# Patient Record
Sex: FEMALE | Race: WHITE | Employment: OTHER | URBAN - METROPOLITAN AREA
[De-identification: names, ages, dates, MRNs, and addresses within clinical notes are randomized per-mention and may not be internally consistent; named-entity substitution may affect disease eponyms.]

---

## 2018-08-21 PROBLEM — Z41.1 ELECTIVE PROCEDURE FOR UNACCEPTABLE COSMETIC APPEARANCE: Status: ACTIVE | Noted: 2018-08-21

## 2021-12-27 RX ORDER — SODIUM CHLORIDE, SODIUM LACTATE, POTASSIUM CHLORIDE, CALCIUM CHLORIDE 600; 310; 30; 20 MG/100ML; MG/100ML; MG/100ML; MG/100ML
1000 INJECTION, SOLUTION INTRAVENOUS CONTINUOUS
Status: CANCELLED | OUTPATIENT
Start: 2021-12-27

## 2021-12-29 ENCOUNTER — HOSPITAL ENCOUNTER (OUTPATIENT)
Dept: PREADMISSION TESTING | Age: 81
Discharge: HOME | End: 2022-01-02
Payer: MEDICARE

## 2021-12-29 VITALS
TEMPERATURE: 98.1 F | DIASTOLIC BLOOD PRESSURE: 79 MMHG | HEART RATE: 77 BPM | WEIGHT: 143 LBS | SYSTOLIC BLOOD PRESSURE: 173 MMHG | BODY MASS INDEX: 25.34 KG/M2 | RESPIRATION RATE: 20 BRPM | OXYGEN SATURATION: 98 % | HEIGHT: 63 IN

## 2021-12-29 LAB
ANION GAP SERPL CALCULATED.3IONS-SCNC: 14 MMOL/L (ref 9–17)
BUN BLDV-MCNC: 22 MG/DL (ref 8–23)
CHLORIDE BLD-SCNC: 105 MMOL/L (ref 98–107)
CO2: 22 MMOL/L (ref 20–31)
CREAT SERPL-MCNC: 0.77 MG/DL (ref 0.5–0.9)
GFR AFRICAN AMERICAN: >60 ML/MIN
GFR NON-AFRICAN AMERICAN: >60 ML/MIN
GFR SERPL CREATININE-BSD FRML MDRD: NORMAL ML/MIN/{1.73_M2}
GFR SERPL CREATININE-BSD FRML MDRD: NORMAL ML/MIN/{1.73_M2}
GLUCOSE BLD-MCNC: 112 MG/DL (ref 70–99)
HCT VFR BLD CALC: 41.8 % (ref 36.3–47.1)
HEMOGLOBIN: 13 G/DL (ref 11.9–15.1)
INR BLD: 0.9
MCH RBC QN AUTO: 28.5 PG (ref 25.2–33.5)
MCHC RBC AUTO-ENTMCNC: 31.1 G/DL (ref 28.4–34.8)
MCV RBC AUTO: 91.7 FL (ref 82.6–102.9)
NRBC AUTOMATED: 0 PER 100 WBC
PARTIAL THROMBOPLASTIN TIME: 25.5 SEC (ref 20.5–30.5)
PDW BLD-RTO: 13.4 % (ref 11.8–14.4)
PLATELET # BLD: 249 K/UL (ref 138–453)
PMV BLD AUTO: 12.4 FL (ref 8.1–13.5)
POTASSIUM SERPL-SCNC: 4 MMOL/L (ref 3.7–5.3)
PROTHROMBIN TIME: 10 SEC (ref 9.1–12.3)
RBC # BLD: 4.56 M/UL (ref 3.95–5.11)
SODIUM BLD-SCNC: 141 MMOL/L (ref 135–144)
WBC # BLD: 7.9 K/UL (ref 3.5–11.3)

## 2021-12-29 PROCEDURE — 82947 ASSAY GLUCOSE BLOOD QUANT: CPT

## 2021-12-29 PROCEDURE — 85610 PROTHROMBIN TIME: CPT

## 2021-12-29 PROCEDURE — 36415 COLL VENOUS BLD VENIPUNCTURE: CPT

## 2021-12-29 PROCEDURE — 85027 COMPLETE CBC AUTOMATED: CPT

## 2021-12-29 PROCEDURE — 80051 ELECTROLYTE PANEL: CPT

## 2021-12-29 PROCEDURE — 93005 ELECTROCARDIOGRAM TRACING: CPT | Performed by: ANESTHESIOLOGY

## 2021-12-29 PROCEDURE — 85730 THROMBOPLASTIN TIME PARTIAL: CPT

## 2021-12-29 PROCEDURE — 84520 ASSAY OF UREA NITROGEN: CPT

## 2021-12-29 PROCEDURE — 82565 ASSAY OF CREATININE: CPT

## 2021-12-29 NOTE — H&P
History and Physical    Pt Name: Olga Malagon  MRN: [id number]  YOB: 1940  Date of evaluation: 12/29/2021  Primary Care Physician: Dorsey Gitelman, MD    SUBJECTIVE:   History of Chief Complaint:    Olga Malagon is a 79 y.o. female who presents for PAT appointment. Patient complains of left flank pain and left lower quadrant pain since May of this year that has worsened in recent months. Patient denies dysuria and states this pain is unassociated with urination. Patient reports urgency, frequency and \"pink\" tinged urine. Patient has been scheduled for CYSTOSCOPY, INSERTION OCCLUSIVE BALLOON  (PT. GOING TO I.R.) - Left and HOLMIUM, PERCUTANEOUS NEPHROLITHOTOMY, C-ARM, LITHOCLAST  (PT. COMING FROM INTERVENTIONAL) - Left  Allergies  is allergic to seasonal.  Medications  Prior to Admission medications    Medication Sig Start Date End Date Taking? Authorizing Provider   atorvastatin (LIPITOR) 20 MG tablet Take 20 mg by mouth daily   Yes Historical Provider, MD   amLODIPine (NORVASC) 2.5 MG tablet Take 2.5 mg by mouth daily. Yes Historical Provider, MD   levothyroxine (SYNTHROID) 25 MCG tablet Take 25 mcg by mouth Daily. Yes Historical Provider, MD   Cholecalciferol (VITAMIN D3) 75718 UNITS CAPS Take by mouth once a week SUNDAYS   Yes Historical Provider, MD   Coenzyme Q10 (CO Q 10 PO) Take  by mouth. Yes Historical Provider, MD   LISINOPRIL PO Take  by mouth. Yes Historical Provider, MD   ezetimibe (ZETIA) 10 MG tablet Take 10 mg by mouth daily. Historical Provider, MD   calcium carbonate (OSCAL) 500 MG TABS tablet Take 500 mg by mouth daily. Historical Provider, MD     Past Medical History    has a past medical history of Dental crowns present, Hyperlipidemia, Hypertension, Osteoarthritis, Renal calculus, Thyroid disease, Under care of team, Under care of team, and Under care of team.  Past Surgical History   has a past surgical history that includes Breast surgery (2005);  Cataract removal with implant (Bilateral, 10/2019); Cosmetic surgery (2021); and Colonoscopy. Social History   reports that she has never smoked. She has never used smokeless tobacco.    has no history on file for alcohol use. reports no history of drug use. Marital Status   Children 1  Occupation retired teacher  Family History  Family Status   Relation Name Status    Mother     Hanna Her Father       family history includes Emphysema in her mother; No Known Problems in her father. Review of Systems:  CONSTITUTIONAL:   negative for fevers, chills, fatigue and malaise    EYES:   negative for double vision, blurred vision and photophobia    HEENT:   negative for tinnitus, epistaxis and sore throat     RESPIRATORY:   negative for cough, shortness of breath, wheezing     CARDIOVASCULAR:   negative for chest pain, palpitations, syncope, edema     GASTROINTESTINAL:   negative for nausea, vomiting     GENITOURINARY:   negative for incontinence history of left flank pain, LLQ pain, urinary frequency, urgency, and pink tinged urine. MUSCULOSKELETAL:   negative for neck or back pain     NEUROLOGICAL:   Negative for weakness and tingling  negative for headaches and dizziness     PSYCHIATRIC:   negative for anxiety       OBJECTIVE:   VITALS:  height is 5' 2.5\" (1.588 m) and weight is 143 lb (64.9 kg). Her temporal temperature is 98.1 °F (36.7 °C). Her blood pressure is 173/79 (abnormal) and her pulse is 77. Her respiration is 20 and oxygen saturation is 98%. CONSTITUTIONAL:alert & oriented x 3, no acute distress. Calm and pleasant but anxious. SKIN:  Warm and dry, no rashes to exposed areas of skin. HEAD:  Normocephalic, atraumatic. EYES: PERRL. EOMs intact. Wearing glasses. EARS:  Intact and equal bilaterally. No edema or thickening, without lumps, lesions, or discharge. Hearing grossly WNL. NOSE:  Nares patent.   No rhinorrhea   MOUTH/THROAT:  Mucous membranes pink and moist, uvula midline, teeth appear to be intact. NECK:supple, no lymphadenopathy  LUNGS: Respirations even and non-labored. Clear to auscultation bilaterally, no wheezes, rales, or rhonchi. CARDIOVASCULAR: Regular rate and rhythm, no murmurs/rubs/gallops   ABDOMEN: soft, non-tender, non-distended, bowel sounds active x 4   EXTREMITIES: No edema to bilateral lower extremities. No varicosities to bilateral lower extremities. NEUROLOGIC: CN II-XII are grossly intact. Gait is smooth, rhythmic and effortless. Testing:   EK2021  Labs pending: drawn 2021   IMPRESSIONS:   Renal calculus.    PLANS:   CYSTOSCOPY, INSERTION OCCLUSIVE BALLOON  (PT. GOING TO I.R.) - Left and HOLMIUM, PERCUTANEOUS NEPHROLITHOTOMY, C-ARM, LITHOCLAST  (PT. COMING FROM INTERVENTIONAL) - Left    NANCIE Mckinney - CNP  Electronically signed 2021 at 4:33 PM

## 2021-12-29 NOTE — H&P (VIEW-ONLY)
History and Physical    Pt Name: Murtaza Devries  MRN: [id number]  YOB: 1940  Date of evaluation: 12/29/2021  Primary Care Physician: Linwood Self MD    SUBJECTIVE:   History of Chief Complaint:    Murtaza Devries is a 79 y.o. female who presents for PAT appointment. Patient complains of left flank pain and left lower quadrant pain since May of this year that has worsened in recent months. Patient denies dysuria and states this pain is unassociated with urination. Patient reports urgency, frequency and \"pink\" tinged urine. Patient has been scheduled for CYSTOSCOPY, INSERTION OCCLUSIVE BALLOON  (PT. GOING TO I.R.) - Left and HOLMIUM, PERCUTANEOUS NEPHROLITHOTOMY, C-ARM, LITHOCLAST  (PT. COMING FROM INTERVENTIONAL) - Left  Allergies  is allergic to seasonal.  Medications  Prior to Admission medications    Medication Sig Start Date End Date Taking? Authorizing Provider   atorvastatin (LIPITOR) 20 MG tablet Take 20 mg by mouth daily   Yes Historical Provider, MD   amLODIPine (NORVASC) 2.5 MG tablet Take 2.5 mg by mouth daily. Yes Historical Provider, MD   levothyroxine (SYNTHROID) 25 MCG tablet Take 25 mcg by mouth Daily. Yes Historical Provider, MD   Cholecalciferol (VITAMIN D3) 33215 UNITS CAPS Take by mouth once a week SUNDAYS   Yes Historical Provider, MD   Coenzyme Q10 (CO Q 10 PO) Take  by mouth. Yes Historical Provider, MD   LISINOPRIL PO Take  by mouth. Yes Historical Provider, MD   ezetimibe (ZETIA) 10 MG tablet Take 10 mg by mouth daily. Historical Provider, MD   calcium carbonate (OSCAL) 500 MG TABS tablet Take 500 mg by mouth daily. Historical Provider, MD     Past Medical History    has a past medical history of Dental crowns present, Hyperlipidemia, Hypertension, Osteoarthritis, Renal calculus, Thyroid disease, Under care of team, Under care of team, and Under care of team.  Past Surgical History   has a past surgical history that includes Breast surgery (2005);  Cataract removal with implant (Bilateral, 10/2019); Cosmetic surgery (2021); and Colonoscopy. Social History   reports that she has never smoked. She has never used smokeless tobacco.    has no history on file for alcohol use. reports no history of drug use. Marital Status   Children 1  Occupation retired teacher  Family History  Family Status   Relation Name Status    Mother     Aaliyah Bailey Father       family history includes Emphysema in her mother; No Known Problems in her father. Review of Systems:  CONSTITUTIONAL:   negative for fevers, chills, fatigue and malaise    EYES:   negative for double vision, blurred vision and photophobia    HEENT:   negative for tinnitus, epistaxis and sore throat     RESPIRATORY:   negative for cough, shortness of breath, wheezing     CARDIOVASCULAR:   negative for chest pain, palpitations, syncope, edema     GASTROINTESTINAL:   negative for nausea, vomiting     GENITOURINARY:   negative for incontinence history of left flank pain, LLQ pain, urinary frequency, urgency, and pink tinged urine. MUSCULOSKELETAL:   negative for neck or back pain     NEUROLOGICAL:   Negative for weakness and tingling  negative for headaches and dizziness     PSYCHIATRIC:   negative for anxiety       OBJECTIVE:   VITALS:  height is 5' 2.5\" (1.588 m) and weight is 143 lb (64.9 kg). Her temporal temperature is 98.1 °F (36.7 °C). Her blood pressure is 173/79 (abnormal) and her pulse is 77. Her respiration is 20 and oxygen saturation is 98%. CONSTITUTIONAL:alert & oriented x 3, no acute distress. Calm and pleasant but anxious. SKIN:  Warm and dry, no rashes to exposed areas of skin. HEAD:  Normocephalic, atraumatic. EYES: PERRL. EOMs intact. Wearing glasses. EARS:  Intact and equal bilaterally. No edema or thickening, without lumps, lesions, or discharge. Hearing grossly WNL. NOSE:  Nares patent.   No rhinorrhea   MOUTH/THROAT:  Mucous membranes pink and moist, uvula midline, teeth appear to be intact. NECK:supple, no lymphadenopathy  LUNGS: Respirations even and non-labored. Clear to auscultation bilaterally, no wheezes, rales, or rhonchi. CARDIOVASCULAR: Regular rate and rhythm, no murmurs/rubs/gallops   ABDOMEN: soft, non-tender, non-distended, bowel sounds active x 4   EXTREMITIES: No edema to bilateral lower extremities. No varicosities to bilateral lower extremities. NEUROLOGIC: CN II-XII are grossly intact. Gait is smooth, rhythmic and effortless. Testing:   EK2021  Labs pending: drawn 2021   IMPRESSIONS:   Renal calculus.    PLANS:   CYSTOSCOPY, INSERTION OCCLUSIVE BALLOON  (PT. GOING TO I.R.) - Left and HOLMIUM, PERCUTANEOUS NEPHROLITHOTOMY, C-ARM, LITHOCLAST  (PT. COMING FROM INTERVENTIONAL) - Left    NANCIE Marc - CNP  Electronically signed 2021 at 4:33 PM

## 2021-12-30 LAB
EKG ATRIAL RATE: 70 BPM
EKG P AXIS: -20 DEGREES
EKG P-R INTERVAL: 166 MS
EKG Q-T INTERVAL: 414 MS
EKG QRS DURATION: 84 MS
EKG QTC CALCULATION (BAZETT): 447 MS
EKG R AXIS: -20 DEGREES
EKG T AXIS: 58 DEGREES
EKG VENTRICULAR RATE: 70 BPM

## 2021-12-30 RX ORDER — SODIUM CHLORIDE 9 MG/ML
INJECTION, SOLUTION INTRAVENOUS CONTINUOUS
Status: CANCELLED | OUTPATIENT
Start: 2021-12-30

## 2021-12-30 RX ORDER — CIPROFLOXACIN 2 MG/ML
400 INJECTION, SOLUTION INTRAVENOUS ONCE
Status: CANCELLED | OUTPATIENT
Start: 2021-12-30 | End: 2021-12-30

## 2022-01-05 ENCOUNTER — ANESTHESIA EVENT (OUTPATIENT)
Dept: OPERATING ROOM | Age: 82
End: 2022-01-05
Payer: MEDICARE

## 2022-01-05 ENCOUNTER — APPOINTMENT (OUTPATIENT)
Dept: GENERAL RADIOLOGY | Age: 82
End: 2022-01-05
Attending: UROLOGY
Payer: MEDICARE

## 2022-01-05 ENCOUNTER — HOSPITAL ENCOUNTER (OUTPATIENT)
Age: 82
Setting detail: OBSERVATION
Discharge: HOME | End: 2022-01-07
Attending: UROLOGY | Admitting: UROLOGY
Payer: MEDICARE

## 2022-01-05 ENCOUNTER — ANESTHESIA (OUTPATIENT)
Dept: OPERATING ROOM | Age: 82
End: 2022-01-05
Payer: MEDICARE

## 2022-01-05 ENCOUNTER — ANESTHESIA (OUTPATIENT)
Dept: INTERVENTIONAL RADIOLOGY/VASCULAR | Age: 82
End: 2022-01-05
Payer: MEDICARE

## 2022-01-05 ENCOUNTER — ANESTHESIA EVENT (OUTPATIENT)
Dept: INTERVENTIONAL RADIOLOGY/VASCULAR | Age: 82
End: 2022-01-05
Payer: MEDICARE

## 2022-01-05 ENCOUNTER — HOSPITAL ENCOUNTER (OUTPATIENT)
Dept: INTERVENTIONAL RADIOLOGY/VASCULAR | Age: 82
Setting detail: OUTPATIENT SURGERY
Discharge: HOME | End: 2022-01-07
Attending: UROLOGY
Payer: MEDICARE

## 2022-01-05 VITALS
DIASTOLIC BLOOD PRESSURE: 57 MMHG | RESPIRATION RATE: 9 BRPM | SYSTOLIC BLOOD PRESSURE: 117 MMHG | OXYGEN SATURATION: 99 %

## 2022-01-05 VITALS — OXYGEN SATURATION: 100 % | DIASTOLIC BLOOD PRESSURE: 85 MMHG | TEMPERATURE: 96.4 F | SYSTOLIC BLOOD PRESSURE: 159 MMHG

## 2022-01-05 VITALS — OXYGEN SATURATION: 98 % | DIASTOLIC BLOOD PRESSURE: 52 MMHG | SYSTOLIC BLOOD PRESSURE: 120 MMHG

## 2022-01-05 DIAGNOSIS — G89.18 POST-OP PAIN: Primary | ICD-10-CM

## 2022-01-05 PROBLEM — N20.0 LEFT RENAL STONE: Status: ACTIVE | Noted: 2022-01-05

## 2022-01-05 LAB
ANION GAP SERPL CALCULATED.3IONS-SCNC: 15 MMOL/L (ref 9–17)
BUN BLDV-MCNC: 17 MG/DL (ref 8–23)
BUN/CREAT BLD: ABNORMAL (ref 9–20)
CALCIUM SERPL-MCNC: 9.4 MG/DL (ref 8.6–10.4)
CHLORIDE BLD-SCNC: 107 MMOL/L (ref 98–107)
CO2: 19 MMOL/L (ref 20–31)
CREAT SERPL-MCNC: 0.84 MG/DL (ref 0.5–0.9)
GFR AFRICAN AMERICAN: >60 ML/MIN
GFR NON-AFRICAN AMERICAN: >60 ML/MIN
GFR SERPL CREATININE-BSD FRML MDRD: ABNORMAL ML/MIN/{1.73_M2}
GFR SERPL CREATININE-BSD FRML MDRD: ABNORMAL ML/MIN/{1.73_M2}
GLUCOSE BLD-MCNC: 139 MG/DL (ref 70–99)
HCT VFR BLD CALC: 40.2 % (ref 36.3–47.1)
HEMOGLOBIN: 12.9 G/DL (ref 11.9–15.1)
MAGNESIUM: 2.2 MG/DL (ref 1.6–2.6)
MCH RBC QN AUTO: 28.8 PG (ref 25.2–33.5)
MCHC RBC AUTO-ENTMCNC: 32.1 G/DL (ref 28.4–34.8)
MCV RBC AUTO: 89.7 FL (ref 82.6–102.9)
NRBC AUTOMATED: 0 PER 100 WBC
PDW BLD-RTO: 13 % (ref 11.8–14.4)
PHOSPHORUS: 3.6 MG/DL (ref 2.6–4.5)
PLATELET # BLD: 172 K/UL (ref 138–453)
PMV BLD AUTO: 12.2 FL (ref 8.1–13.5)
POTASSIUM SERPL-SCNC: 4.3 MMOL/L (ref 3.7–5.3)
RBC # BLD: 4.48 M/UL (ref 3.95–5.11)
SODIUM BLD-SCNC: 141 MMOL/L (ref 135–144)
WBC # BLD: 7.6 K/UL (ref 3.5–11.3)

## 2022-01-05 PROCEDURE — 3600000004 HC SURGERY LEVEL 4 BASE: Performed by: UROLOGY

## 2022-01-05 PROCEDURE — 6360000004 HC RX CONTRAST MEDICATION: Performed by: UROLOGY

## 2022-01-05 PROCEDURE — 2720000010 HC SURG SUPPLY STERILE

## 2022-01-05 PROCEDURE — 2500000003 HC RX 250 WO HCPCS: Performed by: NURSE ANESTHETIST, CERTIFIED REGISTERED

## 2022-01-05 PROCEDURE — 50433 PLMT NEPHROURETERAL CATHETER: CPT

## 2022-01-05 PROCEDURE — 3209999900 FLUORO FOR SURGICAL PROCEDURES

## 2022-01-05 PROCEDURE — 2709999900 HC NON-CHARGEABLE SUPPLY

## 2022-01-05 PROCEDURE — 2580000003 HC RX 258: Performed by: NURSE ANESTHETIST, CERTIFIED REGISTERED

## 2022-01-05 PROCEDURE — 6360000002 HC RX W HCPCS: Performed by: NURSE ANESTHETIST, CERTIFIED REGISTERED

## 2022-01-05 PROCEDURE — 85027 COMPLETE CBC AUTOMATED: CPT

## 2022-01-05 PROCEDURE — 6360000002 HC RX W HCPCS: Performed by: PHYSICIAN ASSISTANT

## 2022-01-05 PROCEDURE — 3600000014 HC SURGERY LEVEL 4 ADDTL 15MIN: Performed by: UROLOGY

## 2022-01-05 PROCEDURE — 2580000003 HC RX 258: Performed by: UROLOGY

## 2022-01-05 PROCEDURE — 6360000002 HC RX W HCPCS: Performed by: SPECIALIST

## 2022-01-05 PROCEDURE — 82365 CALCULUS SPECTROSCOPY: CPT

## 2022-01-05 PROCEDURE — 80048 BASIC METABOLIC PNL TOTAL CA: CPT

## 2022-01-05 PROCEDURE — 3600000002 HC SURGERY LEVEL 2 BASE: Performed by: UROLOGY

## 2022-01-05 PROCEDURE — 83735 ASSAY OF MAGNESIUM: CPT

## 2022-01-05 PROCEDURE — 2709999900 HC NON-CHARGEABLE SUPPLY: Performed by: UROLOGY

## 2022-01-05 PROCEDURE — 3700000000 HC ANESTHESIA ATTENDED CARE: Performed by: UROLOGY

## 2022-01-05 PROCEDURE — 7100000001 HC PACU RECOVERY - ADDTL 15 MIN: Performed by: UROLOGY

## 2022-01-05 PROCEDURE — 7100000000 HC PACU RECOVERY - FIRST 15 MIN: Performed by: UROLOGY

## 2022-01-05 PROCEDURE — 3700000000 HC ANESTHESIA ATTENDED CARE

## 2022-01-05 PROCEDURE — C1894 INTRO/SHEATH, NON-LASER: HCPCS

## 2022-01-05 PROCEDURE — C2628 CATHETER, OCCLUSION: HCPCS | Performed by: UROLOGY

## 2022-01-05 PROCEDURE — C1887 CATHETER, GUIDING: HCPCS

## 2022-01-05 PROCEDURE — 3700000001 HC ADD 15 MINUTES (ANESTHESIA): Performed by: UROLOGY

## 2022-01-05 PROCEDURE — 2580000003 HC RX 258: Performed by: ANESTHESIOLOGY

## 2022-01-05 PROCEDURE — 2580000003 HC RX 258: Performed by: PHYSICIAN ASSISTANT

## 2022-01-05 PROCEDURE — C1769 GUIDE WIRE: HCPCS | Performed by: UROLOGY

## 2022-01-05 PROCEDURE — 3600000012 HC SURGERY LEVEL 2 ADDTL 15MIN: Performed by: UROLOGY

## 2022-01-05 PROCEDURE — C1894 INTRO/SHEATH, NON-LASER: HCPCS | Performed by: UROLOGY

## 2022-01-05 PROCEDURE — 6370000000 HC RX 637 (ALT 250 FOR IP): Performed by: STUDENT IN AN ORGANIZED HEALTH CARE EDUCATION/TRAINING PROGRAM

## 2022-01-05 PROCEDURE — 84100 ASSAY OF PHOSPHORUS: CPT

## 2022-01-05 PROCEDURE — 3700000001 HC ADD 15 MINUTES (ANESTHESIA)

## 2022-01-05 PROCEDURE — C1769 GUIDE WIRE: HCPCS

## 2022-01-05 PROCEDURE — C1726 CATH, BAL DIL, NON-VASCULAR: HCPCS | Performed by: UROLOGY

## 2022-01-05 PROCEDURE — 6360000002 HC RX W HCPCS: Performed by: ANESTHESIOLOGY

## 2022-01-05 RX ORDER — POLYETHYLENE GLYCOL 3350 17 G/17G
17 POWDER, FOR SOLUTION ORAL DAILY PRN
Qty: 510 G | Refills: 0 | Status: SHIPPED | OUTPATIENT
Start: 2022-01-05 | End: 2022-02-04

## 2022-01-05 RX ORDER — SODIUM CHLORIDE 9 MG/ML
INJECTION, SOLUTION INTRAVENOUS CONTINUOUS PRN
Status: DISCONTINUED | OUTPATIENT
Start: 2022-01-05 | End: 2022-01-05 | Stop reason: SDUPTHER

## 2022-01-05 RX ORDER — LEVOTHYROXINE SODIUM 0.03 MG/1
25 TABLET ORAL DAILY
Status: DISCONTINUED | OUTPATIENT
Start: 2022-01-06 | End: 2022-01-07 | Stop reason: HOSPADM

## 2022-01-05 RX ORDER — SODIUM CHLORIDE 9 MG/ML
INJECTION INTRAVENOUS PRN
Status: DISCONTINUED | OUTPATIENT
Start: 2022-01-05 | End: 2022-01-06 | Stop reason: ALTCHOICE

## 2022-01-05 RX ORDER — SODIUM CHLORIDE 0.9 % (FLUSH) 0.9 %
5-40 SYRINGE (ML) INJECTION EVERY 12 HOURS SCHEDULED
Status: DISCONTINUED | OUTPATIENT
Start: 2022-01-05 | End: 2022-01-07 | Stop reason: HOSPADM

## 2022-01-05 RX ORDER — AMLODIPINE BESYLATE 2.5 MG/1
2.5 TABLET ORAL DAILY
Status: DISCONTINUED | OUTPATIENT
Start: 2022-01-06 | End: 2022-01-07 | Stop reason: HOSPADM

## 2022-01-05 RX ORDER — ONDANSETRON 4 MG/1
4 TABLET, ORALLY DISINTEGRATING ORAL EVERY 8 HOURS PRN
Status: DISCONTINUED | OUTPATIENT
Start: 2022-01-05 | End: 2022-01-07 | Stop reason: HOSPADM

## 2022-01-05 RX ORDER — SODIUM CHLORIDE 9 MG/ML
INJECTION, SOLUTION INTRAVENOUS CONTINUOUS
Status: DISCONTINUED | OUTPATIENT
Start: 2022-01-05 | End: 2022-01-07 | Stop reason: HOSPADM

## 2022-01-05 RX ORDER — GLYCOPYRROLATE 1 MG/5 ML
SYRINGE (ML) INTRAVENOUS PRN
Status: DISCONTINUED | OUTPATIENT
Start: 2022-01-05 | End: 2022-01-05 | Stop reason: SDUPTHER

## 2022-01-05 RX ORDER — OXYCODONE HYDROCHLORIDE AND ACETAMINOPHEN 5; 325 MG/1; MG/1
2 TABLET ORAL EVERY 4 HOURS PRN
Status: DISCONTINUED | OUTPATIENT
Start: 2022-01-05 | End: 2022-01-07 | Stop reason: HOSPADM

## 2022-01-05 RX ORDER — MORPHINE SULFATE 2 MG/ML
2 INJECTION, SOLUTION INTRAMUSCULAR; INTRAVENOUS EVERY 5 MIN PRN
Status: DISCONTINUED | OUTPATIENT
Start: 2022-01-05 | End: 2022-01-05

## 2022-01-05 RX ORDER — SODIUM CHLORIDE 9 MG/ML
25 INJECTION, SOLUTION INTRAVENOUS PRN
Status: DISCONTINUED | OUTPATIENT
Start: 2022-01-05 | End: 2022-01-07 | Stop reason: HOSPADM

## 2022-01-05 RX ORDER — SODIUM CHLORIDE, SODIUM LACTATE, POTASSIUM CHLORIDE, CALCIUM CHLORIDE 600; 310; 30; 20 MG/100ML; MG/100ML; MG/100ML; MG/100ML
1000 INJECTION, SOLUTION INTRAVENOUS CONTINUOUS
Status: DISCONTINUED | OUTPATIENT
Start: 2022-01-05 | End: 2022-01-05

## 2022-01-05 RX ORDER — OXYCODONE HYDROCHLORIDE AND ACETAMINOPHEN 5; 325 MG/1; MG/1
2 TABLET ORAL PRN
Status: DISCONTINUED | OUTPATIENT
Start: 2022-01-05 | End: 2022-01-05

## 2022-01-05 RX ORDER — PROPOFOL 10 MG/ML
INJECTION, EMULSION INTRAVENOUS PRN
Status: DISCONTINUED | OUTPATIENT
Start: 2022-01-05 | End: 2022-01-05 | Stop reason: SDUPTHER

## 2022-01-05 RX ORDER — DEXAMETHASONE SODIUM PHOSPHATE 10 MG/ML
INJECTION INTRAMUSCULAR; INTRAVENOUS PRN
Status: DISCONTINUED | OUTPATIENT
Start: 2022-01-05 | End: 2022-01-05 | Stop reason: SDUPTHER

## 2022-01-05 RX ORDER — FENTANYL CITRATE 50 UG/ML
INJECTION, SOLUTION INTRAMUSCULAR; INTRAVENOUS PRN
Status: DISCONTINUED | OUTPATIENT
Start: 2022-01-05 | End: 2022-01-05 | Stop reason: SDUPTHER

## 2022-01-05 RX ORDER — PROPOFOL 10 MG/ML
INJECTION, EMULSION INTRAVENOUS CONTINUOUS PRN
Status: DISCONTINUED | OUTPATIENT
Start: 2022-01-05 | End: 2022-01-05 | Stop reason: SDUPTHER

## 2022-01-05 RX ORDER — LIDOCAINE HYDROCHLORIDE 10 MG/ML
INJECTION, SOLUTION EPIDURAL; INFILTRATION; INTRACAUDAL; PERINEURAL PRN
Status: DISCONTINUED | OUTPATIENT
Start: 2022-01-05 | End: 2022-01-05 | Stop reason: SDUPTHER

## 2022-01-05 RX ORDER — SODIUM CHLORIDE 9 MG/ML
INJECTION, SOLUTION INTRAVENOUS CONTINUOUS
Status: DISCONTINUED | OUTPATIENT
Start: 2022-01-05 | End: 2022-01-05

## 2022-01-05 RX ORDER — MAGNESIUM HYDROXIDE 1200 MG/15ML
LIQUID ORAL PRN
Status: DISCONTINUED | OUTPATIENT
Start: 2022-01-05 | End: 2022-01-05 | Stop reason: ALTCHOICE

## 2022-01-05 RX ORDER — CIPROFLOXACIN 500 MG/1
500 TABLET, FILM COATED ORAL 2 TIMES DAILY
Qty: 6 TABLET | Refills: 0 | Status: SHIPPED | OUTPATIENT
Start: 2022-01-05 | End: 2022-01-08

## 2022-01-05 RX ORDER — MIDAZOLAM HYDROCHLORIDE 1 MG/ML
INJECTION INTRAMUSCULAR; INTRAVENOUS PRN
Status: DISCONTINUED | OUTPATIENT
Start: 2022-01-05 | End: 2022-01-05 | Stop reason: SDUPTHER

## 2022-01-05 RX ORDER — FENTANYL CITRATE 50 UG/ML
25 INJECTION, SOLUTION INTRAMUSCULAR; INTRAVENOUS EVERY 5 MIN PRN
Status: DISCONTINUED | OUTPATIENT
Start: 2022-01-05 | End: 2022-01-05

## 2022-01-05 RX ORDER — CIPROFLOXACIN 2 MG/ML
400 INJECTION, SOLUTION INTRAVENOUS ONCE
Status: COMPLETED | OUTPATIENT
Start: 2022-01-05 | End: 2022-01-05

## 2022-01-05 RX ORDER — OXYCODONE HYDROCHLORIDE AND ACETAMINOPHEN 5; 325 MG/1; MG/1
1 TABLET ORAL EVERY 6 HOURS PRN
Qty: 15 TABLET | Refills: 0 | Status: SHIPPED | OUTPATIENT
Start: 2022-01-05 | End: 2022-01-08

## 2022-01-05 RX ORDER — POLYETHYLENE GLYCOL 3350 17 G/17G
17 POWDER, FOR SOLUTION ORAL DAILY PRN
Status: DISCONTINUED | OUTPATIENT
Start: 2022-01-05 | End: 2022-01-07 | Stop reason: HOSPADM

## 2022-01-05 RX ORDER — ACETAMINOPHEN 325 MG/1
650 TABLET ORAL EVERY 6 HOURS PRN
Status: DISCONTINUED | OUTPATIENT
Start: 2022-01-05 | End: 2022-01-07 | Stop reason: HOSPADM

## 2022-01-05 RX ORDER — ROCURONIUM BROMIDE 10 MG/ML
INJECTION, SOLUTION INTRAVENOUS PRN
Status: DISCONTINUED | OUTPATIENT
Start: 2022-01-05 | End: 2022-01-05 | Stop reason: SDUPTHER

## 2022-01-05 RX ORDER — OXYCODONE HYDROCHLORIDE AND ACETAMINOPHEN 5; 325 MG/1; MG/1
1 TABLET ORAL EVERY 4 HOURS PRN
Status: DISCONTINUED | OUTPATIENT
Start: 2022-01-05 | End: 2022-01-07 | Stop reason: HOSPADM

## 2022-01-05 RX ORDER — SODIUM CHLORIDE 0.9 % (FLUSH) 0.9 %
5-40 SYRINGE (ML) INJECTION PRN
Status: DISCONTINUED | OUTPATIENT
Start: 2022-01-05 | End: 2022-01-07 | Stop reason: HOSPADM

## 2022-01-05 RX ORDER — DIPHENHYDRAMINE HYDROCHLORIDE 50 MG/ML
12.5 INJECTION INTRAMUSCULAR; INTRAVENOUS
Status: DISCONTINUED | OUTPATIENT
Start: 2022-01-05 | End: 2022-01-05

## 2022-01-05 RX ORDER — LABETALOL HYDROCHLORIDE 5 MG/ML
5 INJECTION, SOLUTION INTRAVENOUS EVERY 10 MIN PRN
Status: DISCONTINUED | OUTPATIENT
Start: 2022-01-05 | End: 2022-01-05

## 2022-01-05 RX ORDER — ONDANSETRON 2 MG/ML
INJECTION INTRAMUSCULAR; INTRAVENOUS PRN
Status: DISCONTINUED | OUTPATIENT
Start: 2022-01-05 | End: 2022-01-05 | Stop reason: SDUPTHER

## 2022-01-05 RX ORDER — ATORVASTATIN CALCIUM 20 MG/1
20 TABLET, FILM COATED ORAL DAILY
Status: DISCONTINUED | OUTPATIENT
Start: 2022-01-05 | End: 2022-01-07 | Stop reason: HOSPADM

## 2022-01-05 RX ORDER — ONDANSETRON 2 MG/ML
4 INJECTION INTRAMUSCULAR; INTRAVENOUS EVERY 6 HOURS PRN
Status: DISCONTINUED | OUTPATIENT
Start: 2022-01-05 | End: 2022-01-07 | Stop reason: HOSPADM

## 2022-01-05 RX ORDER — ONDANSETRON 2 MG/ML
4 INJECTION INTRAMUSCULAR; INTRAVENOUS
Status: DISCONTINUED | OUTPATIENT
Start: 2022-01-05 | End: 2022-01-05

## 2022-01-05 RX ORDER — OXYCODONE HYDROCHLORIDE AND ACETAMINOPHEN 5; 325 MG/1; MG/1
1 TABLET ORAL PRN
Status: DISCONTINUED | OUTPATIENT
Start: 2022-01-05 | End: 2022-01-05

## 2022-01-05 RX ADMIN — SODIUM CHLORIDE: 0.9 INJECTION, SOLUTION INTRAVENOUS at 12:01

## 2022-01-05 RX ADMIN — FENTANYL CITRATE 50 MCG: 50 INJECTION, SOLUTION INTRAMUSCULAR; INTRAVENOUS at 12:03

## 2022-01-05 RX ADMIN — Medication 0.2 MG: at 13:48

## 2022-01-05 RX ADMIN — LIDOCAINE HYDROCHLORIDE 40 MG: 10 INJECTION, SOLUTION EPIDURAL; INFILTRATION; INTRACAUDAL; PERINEURAL at 12:06

## 2022-01-05 RX ADMIN — IOPAMIDOL 14 ML: 755 INJECTION, SOLUTION INTRAVENOUS at 13:15

## 2022-01-05 RX ADMIN — SODIUM CHLORIDE, POTASSIUM CHLORIDE, SODIUM LACTATE AND CALCIUM CHLORIDE: 600; 310; 30; 20 INJECTION, SOLUTION INTRAVENOUS at 14:42

## 2022-01-05 RX ADMIN — OXYCODONE HYDROCHLORIDE AND ACETAMINOPHEN 1 TABLET: 5; 325 TABLET ORAL at 22:05

## 2022-01-05 RX ADMIN — DEXAMETHASONE SODIUM PHOSPHATE 5 MG: 10 INJECTION INTRAMUSCULAR; INTRAVENOUS at 13:38

## 2022-01-05 RX ADMIN — FENTANYL CITRATE 50 MCG: 50 INJECTION, SOLUTION INTRAMUSCULAR; INTRAVENOUS at 12:18

## 2022-01-05 RX ADMIN — ONDANSETRON 4 MG: 2 INJECTION INTRAMUSCULAR; INTRAVENOUS at 14:35

## 2022-01-05 RX ADMIN — FENTANYL CITRATE 25 MCG: 50 INJECTION, SOLUTION INTRAMUSCULAR; INTRAVENOUS at 15:38

## 2022-01-05 RX ADMIN — FENTANYL CITRATE 75 MCG: 50 INJECTION, SOLUTION INTRAMUSCULAR; INTRAVENOUS at 13:31

## 2022-01-05 RX ADMIN — SODIUM CHLORIDE, POTASSIUM CHLORIDE, SODIUM LACTATE AND CALCIUM CHLORIDE: 600; 310; 30; 20 INJECTION, SOLUTION INTRAVENOUS at 12:03

## 2022-01-05 RX ADMIN — PROPOFOL 120 MG: 10 INJECTION, EMULSION INTRAVENOUS at 13:31

## 2022-01-05 RX ADMIN — SODIUM CHLORIDE: 9 INJECTION, SOLUTION INTRAVENOUS at 11:12

## 2022-01-05 RX ADMIN — ONDANSETRON 4 MG: 2 INJECTION INTRAMUSCULAR; INTRAVENOUS at 12:25

## 2022-01-05 RX ADMIN — CIPROFLOXACIN 400 MG: 2 INJECTION, SOLUTION INTRAVENOUS at 12:07

## 2022-01-05 RX ADMIN — PROPOFOL 50 MCG/KG/MIN: 10 INJECTION, EMULSION INTRAVENOUS at 12:43

## 2022-01-05 RX ADMIN — PROPOFOL 100 MG: 10 INJECTION, EMULSION INTRAVENOUS at 12:06

## 2022-01-05 RX ADMIN — MORPHINE SULFATE 2 MG: 2 INJECTION, SOLUTION INTRAMUSCULAR; INTRAVENOUS at 15:23

## 2022-01-05 RX ADMIN — PHENYLEPHRINE HYDROCHLORIDE 50 MCG: 10 INJECTION INTRAVENOUS at 13:55

## 2022-01-05 RX ADMIN — PHENYLEPHRINE HYDROCHLORIDE 100 MCG: 10 INJECTION INTRAVENOUS at 14:09

## 2022-01-05 RX ADMIN — OXYCODONE HYDROCHLORIDE AND ACETAMINOPHEN 2 TABLET: 5; 325 TABLET ORAL at 16:42

## 2022-01-05 RX ADMIN — PHENYLEPHRINE HYDROCHLORIDE 50 MCG: 10 INJECTION INTRAVENOUS at 13:46

## 2022-01-05 RX ADMIN — SUGAMMADEX 200 MG: 100 INJECTION, SOLUTION INTRAVENOUS at 14:39

## 2022-01-05 RX ADMIN — ROCURONIUM BROMIDE 50 MG: 10 INJECTION INTRAVENOUS at 13:31

## 2022-01-05 RX ADMIN — LIDOCAINE HYDROCHLORIDE 50 MG: 10 INJECTION, SOLUTION EPIDURAL; INFILTRATION; INTRACAUDAL; PERINEURAL at 13:31

## 2022-01-05 RX ADMIN — SODIUM CHLORIDE: 9 INJECTION, SOLUTION INTRAVENOUS at 16:43

## 2022-01-05 RX ADMIN — FENTANYL CITRATE 25 MCG: 50 INJECTION, SOLUTION INTRAMUSCULAR; INTRAVENOUS at 12:43

## 2022-01-05 RX ADMIN — MIDAZOLAM HYDROCHLORIDE 2 MG: 1 INJECTION, SOLUTION INTRAMUSCULAR; INTRAVENOUS at 12:03

## 2022-01-05 RX ADMIN — ATORVASTATIN CALCIUM 20 MG: 20 TABLET, FILM COATED ORAL at 21:58

## 2022-01-05 RX ADMIN — DEXAMETHASONE SODIUM PHOSPHATE 4 MG: 10 INJECTION INTRAMUSCULAR; INTRAVENOUS at 12:09

## 2022-01-05 ASSESSMENT — PULMONARY FUNCTION TESTS
PIF_VALUE: 1
PIF_VALUE: 1
PIF_VALUE: 20
PIF_VALUE: 1
PIF_VALUE: 1
PIF_VALUE: 0
PIF_VALUE: 20
PIF_VALUE: 2
PIF_VALUE: 20
PIF_VALUE: 1
PIF_VALUE: 0
PIF_VALUE: 20
PIF_VALUE: 1
PIF_VALUE: 0
PIF_VALUE: 1
PIF_VALUE: 16
PIF_VALUE: 1
PIF_VALUE: 0
PIF_VALUE: 20
PIF_VALUE: 1
PIF_VALUE: 20
PIF_VALUE: 1
PIF_VALUE: 20
PIF_VALUE: 1
PIF_VALUE: 20
PIF_VALUE: 19
PIF_VALUE: 20
PIF_VALUE: 19
PIF_VALUE: 1
PIF_VALUE: 0
PIF_VALUE: 20
PIF_VALUE: 1
PIF_VALUE: 16
PIF_VALUE: 1
PIF_VALUE: 20
PIF_VALUE: 1
PIF_VALUE: 1
PIF_VALUE: 20
PIF_VALUE: 0
PIF_VALUE: 0
PIF_VALUE: 20
PIF_VALUE: 1
PIF_VALUE: 20
PIF_VALUE: 1
PIF_VALUE: 1
PIF_VALUE: 19
PIF_VALUE: 16
PIF_VALUE: 1
PIF_VALUE: 17
PIF_VALUE: 19
PIF_VALUE: 0
PIF_VALUE: 19
PIF_VALUE: 1
PIF_VALUE: 20
PIF_VALUE: 1
PIF_VALUE: 16
PIF_VALUE: 20
PIF_VALUE: 4
PIF_VALUE: 19
PIF_VALUE: 19
PIF_VALUE: 0
PIF_VALUE: 22
PIF_VALUE: 19
PIF_VALUE: 1
PIF_VALUE: 21
PIF_VALUE: 0
PIF_VALUE: 20
PIF_VALUE: 17
PIF_VALUE: 21
PIF_VALUE: 20
PIF_VALUE: 17
PIF_VALUE: 1
PIF_VALUE: 22
PIF_VALUE: 21
PIF_VALUE: 4
PIF_VALUE: 1
PIF_VALUE: 19
PIF_VALUE: 20
PIF_VALUE: 1
PIF_VALUE: 1
PIF_VALUE: 20
PIF_VALUE: 20
PIF_VALUE: 1
PIF_VALUE: 0
PIF_VALUE: 1
PIF_VALUE: 2
PIF_VALUE: 22
PIF_VALUE: 0
PIF_VALUE: 17
PIF_VALUE: 16
PIF_VALUE: 26
PIF_VALUE: 19
PIF_VALUE: 1
PIF_VALUE: 19
PIF_VALUE: 8
PIF_VALUE: 19
PIF_VALUE: 1
PIF_VALUE: 20
PIF_VALUE: 20
PIF_VALUE: 1
PIF_VALUE: 18
PIF_VALUE: 20
PIF_VALUE: 19
PIF_VALUE: 1
PIF_VALUE: 0
PIF_VALUE: 1
PIF_VALUE: 1
PIF_VALUE: 27
PIF_VALUE: 20
PIF_VALUE: 1
PIF_VALUE: 1
PIF_VALUE: 20
PIF_VALUE: 17
PIF_VALUE: 1
PIF_VALUE: 0
PIF_VALUE: 1
PIF_VALUE: 1
PIF_VALUE: 20
PIF_VALUE: 20
PIF_VALUE: 1
PIF_VALUE: 19
PIF_VALUE: 20
PIF_VALUE: 20
PIF_VALUE: 0
PIF_VALUE: 1
PIF_VALUE: 20
PIF_VALUE: 20
PIF_VALUE: 0
PIF_VALUE: 4
PIF_VALUE: 1
PIF_VALUE: 1
PIF_VALUE: 0
PIF_VALUE: 1
PIF_VALUE: 1
PIF_VALUE: 19
PIF_VALUE: 20
PIF_VALUE: 1
PIF_VALUE: 20

## 2022-01-05 ASSESSMENT — PAIN SCALES - GENERAL
PAINLEVEL_OUTOF10: 0
PAINLEVEL_OUTOF10: 0
PAINLEVEL_OUTOF10: 5
PAINLEVEL_OUTOF10: 5
PAINLEVEL_OUTOF10: 7
PAINLEVEL_OUTOF10: 7
PAINLEVEL_OUTOF10: 4
PAINLEVEL_OUTOF10: 5
PAINLEVEL_OUTOF10: 0
PAINLEVEL_OUTOF10: 7
PAINLEVEL_OUTOF10: 5

## 2022-01-05 ASSESSMENT — PAIN - FUNCTIONAL ASSESSMENT: PAIN_FUNCTIONAL_ASSESSMENT: 0-10

## 2022-01-05 ASSESSMENT — PAIN SCALES - WONG BAKER
WONGBAKER_NUMERICALRESPONSE: 0
WONGBAKER_NUMERICALRESPONSE: 0

## 2022-01-05 ASSESSMENT — PAIN DESCRIPTION - DESCRIPTORS
DESCRIPTORS: ACHING
DESCRIPTORS: ACHING

## 2022-01-05 ASSESSMENT — PAIN DESCRIPTION - PAIN TYPE
TYPE: SURGICAL PAIN
TYPE: SURGICAL PAIN

## 2022-01-05 ASSESSMENT — PAIN DESCRIPTION - ORIENTATION
ORIENTATION: LEFT
ORIENTATION: LEFT

## 2022-01-05 ASSESSMENT — PAIN DESCRIPTION - LOCATION
LOCATION: FLANK
LOCATION: FLANK

## 2022-01-05 NOTE — OP NOTE
Operative Note      Patient: Sayda Staples  YOB: 1940  MRN: [id number]    Date of Procedure: 1/5/2022    Pre-Op Diagnosis: LEFT KIDNEY STONE (>2cm)    Post-Op Diagnosis: Same       Procedure(s):  HOLMIUM -STAND-BY, PERCUTANEOUS NEPHROLITHOTOMY, C-ARM, LITHOCLAST  (PT. COMING FROM INTERVENTIONAL)    Surgeon(s):  Muriel Beltran MD    Assistant:   Merrie Lefort, MD PGY-2  Tad Garcia MD PGY-3    Anesthesia: General    Estimated Blood Loss (mL): less than 50     Complications: None    Specimens:   ID Type Source Tests Collected by Time Destination   1 : LEFT KIDNEY STONE Stone (Calculus) Kidney STONE ANALYSIS Muriel Beltran MD 1/5/2022 1439        Implants:  * No implants in log *      Drains:   Urethral Catheter Double-lumen;Straight-tip 16 fr (Active)       Urethral Catheter Double-lumen 22 fr (Active)       Findings:   1. Left renal stones    INDICATIONS FOR PROCEDURE:  The patient is a 79 y.o. female with a history of left kidney stones with total stone burden of > 2 cm. The risks and benefits of the procedure as well as possible alternatives and complications were discussed and she consented. DETAILS OF THE PROCEDURE:  The patient underwent balloon catheter placement followed by percutaneous access with interventional radiology. Once this was completed the patient was brought back to the operating room, placed under general endotracheal anesthesia, prepped and draped in the prone position with left side bumped. She was was given appropriate antibiotic re-dosing. A second time out was performed. The percutaneous drain was removed and a jesse catheter was placed over the previously placed wire. A second wire was advanced into the ureter as visualized on fluoroscopy. The catheter was removed and a skin incision was made. The nephromax dilator was advanced into the calyx and inflated to 16mmHg pressure under fluoroscopic guiadnace.   The sheath was advanced and the nephroscope was inserted. Stones were located in the lower pole and mid pole. The Swiss Trilogy lithoclast was inserted and through a combination of  US lithotripsy, pneumatic impactor, and grasping, the stones were fragmented and removed. A flexible cystoscope was inserted and a thorough pyeloscopy was performed. Additional stones were identified and removed. The occlusion balloon catheter was removed. A flexible ureteroscope was inserted and antegrade ureteroscopy was performed. At this point the patient appeared to be stone free. A 16Fr ospina catheter was inserted though the nephrostomy sheath and appeared to be in good position under pyelogram. The tube was sutured in place with 0 Ethibond stitch and hemostasis was achieved. Pressure dressing was placed. The patient tolerated the procedure well and was sent to PACU for postoperative monitoring. DISPOSITION:  The patient was admitted to the floor for post-operative monitoring. A CT scan will be performed in the morning to assess for any residual stones.        Electronically signed by Aida Lee MD on 1/5/2022 at 2:46 PM

## 2022-01-05 NOTE — ANESTHESIA PRE PROCEDURE
Department of Anesthesiology  Preprocedure Note       Name:  Mohit Bolaños   Age:  79 y.o.  :  1940                                          MRN:  [id number]         Date:  2022      Surgeon: * No surgeons listed *    Procedure:   IR URETERAL PLACEMENT STENT&NEPHRO [address]             Name:  Mohit Bolaños                                         Age:  79 y.o. MRN:  [id number]             Medications  No current facility-administered medications for this encounter. No current outpatient medications on file.      Facility-Administered Medications Ordered in Other Encounters   Medication Dose Route Frequency Provider Last Rate Last Admin    0.9 % sodium chloride infusion   IntraVENous Continuous Brandon Tee PA 20 mL/hr at 22 1112 New Bag at 22 1112    lactated ringers infusion 1,000 mL  1,000 mL IntraVENous Continuous Janice Abreu MD        dexamethasone (DECADRON) injection   IntraVENous PRN Colleen Carneys, APRN - CRNA   4 mg at 22 1209    midazolam (VERSED) injection   IntraVENous PRN Colleen Carneys, APRN - CRNA   2 mg at 22 1203    fentaNYL (SUBLIMAZE) injection   IntraVENous PRN Monia Kemps, APRN - CRNA   50 mcg at 22 1203    lidocaine PF 1 % injection   IntraVENous PRN Monia Kemps, APRN - CRNA   40 mg at 22 1206    0.9 % sodium chloride infusion   IntraVENous Continuous PRN Monia Kemps, APRN - CRNA   New Bag at 22 1201    sterile water for irrigation    PRN Shravan Cruz MD   1,500 mL at 22 1219    iothalamate (CONRAY) 60 % injection    PRN Shravan Cruz MD   50 mL at 22 1219       Allergies   Allergen Reactions    Seasonal      Patient Active Problem List   Diagnosis    Hypertrophy of breast    Cervicalgia    Backache    Other plastic surgery for unacceptable cosmetic appearance    Elective procedure for unacceptable cosmetic appearance     Past Medical History:   Diagnosis Date    Dental crowns present 2021 TEMPORARY X2 - PT'S DENTIST WANT ANESTHESIA TO BE AWARE.  Hyperlipidemia     Hypertension     Osteoarthritis     Renal calculus     Thyroid disease 2021    hypothyroid    Under care of team     PCP - DR. Lady Kern Under care of team 2021    UROLOGY - DR. GHOSH - LAST VISIT 2021    Under care of team 2021    [facility] -  [facility]     Past Surgical History:   Procedure Laterality Date    BREAST SURGERY      cresencio breast reduction- Dr. Justine Gold Bilateral 10/2019    COLONOSCOPY      COSMETIC SURGERY  2021    Laser resurfacing and deep oral and 50/50 ocular- Dr. Jimmy Presley     Social History     Tobacco Use    Smoking status: Never Smoker    Smokeless tobacco: Never Used   Substance Use Topics    Alcohol use: Not on file     Comment: NO    Drug use: No         Vital Signs (Current)   There were no vitals filed for this visit. Vital Signs Statistics (for past 48 hrs)     Temp  Av.5 °F (36.4 °C)  Min: 97.5 °F (36.4 °C)   Min taken time: 22 1033  Max: 97.5 °F (36.4 °C)   Max taken time: 22 1033  Pulse  Av  Min: 93   Min taken time: 22 1033  Max: 93   Max taken time: 22 1033  Resp  Av.8  Min: 0   Min taken time: 22 1218  Max: 16   Max taken time: 22 1033  BP  Min: 105/50   Min taken time: 22 1217  Max: 183/74   Max taken time: 22 1033  MAP (mmHg)  Av.5  Min: 79   Min taken time: 22 1217  Max: 100   Max taken time: 22 1206  SpO2  Av.7 %  Min: 86 %   Min taken time: 22 1209  Max: 100 %   Max taken time: 22 1207  BP Readings from Last 3 Encounters:   22 (!) 183/74   22 (!) 105/50   21 (!) 173/79       BMI  There is no height or weight on file to calculate BMI.     CBC   Lab Results   Component Value Date    WBC 7.9 2021    RBC 4.56 2021    HGB 13.0 2021    HCT 41.8 2021    MCV 91.7 2021    RDW 13.4 2021  12/29/2021       CMP    Lab Results   Component Value Date     12/29/2021    K 4.0 12/29/2021     12/29/2021    CO2 22 12/29/2021    BUN 22 12/29/2021    CREATININE 0.77 12/29/2021    GFRAA >60 12/29/2021    LABGLOM >60 12/29/2021    GLUCOSE 112 12/29/2021       BMP    Lab Results   Component Value Date     12/29/2021    K 4.0 12/29/2021     12/29/2021    CO2 22 12/29/2021    BUN 22 12/29/2021    CREATININE 0.77 12/29/2021    GFRAA >60 12/29/2021    LABGLOM >60 12/29/2021    GLUCOSE 112 12/29/2021       POC Testing  No results for input(s): POCGLU, POCNA, POCK, POCCL, POCBUN, POCHEMO, POCHCT in the last 72 hours. Coags    Lab Results   Component Value Date    PROTIME 10.0 12/29/2021    INR 0.9 12/29/2021    APTT 25.5 12/29/2021       HCG (If Applicable) No results found for: PREGTESTUR, PREGSERUM, HCG, HCGQUANT     ABGs No results found for: PHART, PO2ART, JXP0XFS, VEL5LXJ, BEART, R1ZOICLU     Type & Screen (If Applicable)  No results found for: [facility]    Radiology (If Applicable)    Cardiac Testing (If Applicable)     EKG (If Applicable) ? [facility]          Medications prior to admission:   Prior to Admission medications    Medication Sig Start Date End Date Taking? Authorizing Provider   atorvastatin (LIPITOR) 20 MG tablet Take 20 mg by mouth daily    Historical Provider, MD   amLODIPine (NORVASC) 2.5 MG tablet Take 2.5 mg by mouth daily. Historical Provider, MD   levothyroxine (SYNTHROID) 25 MCG tablet Take 25 mcg by mouth Daily. Historical Provider, MD   Cholecalciferol (VITAMIN D3) 87920 UNITS CAPS Take by mouth once a week SUNDAYS    Historical Provider, MD   calcium carbonate (OSCAL) 500 MG TABS tablet Take 500 mg by mouth daily. Historical Provider, MD   Coenzyme Q10 (CO Q 10 PO) Take  by mouth. Historical Provider, MD   LISINOPRIL PO Take  by mouth.     Historical Provider, MD       Current medications:    No current facility-administered medications for this Procedure Laterality Date    BREAST SURGERY  2005    cresencio breast reduction- Dr. Carmela Rodriguez Bilateral 10/2019    COLONOSCOPY      COSMETIC SURGERY  01/28/2021    Laser resurfacing and deep oral and 50/50 ocular- Dr. Megan Archer       Social History:    Social History     Tobacco Use    Smoking status: Never Smoker    Smokeless tobacco: Never Used   Substance Use Topics    Alcohol use: Not on file     Comment: NO                                Counseling given: Not Answered      Vital Signs (Current): There were no vitals filed for this visit. BP Readings from Last 3 Encounters:   01/05/22 (!) 183/74   01/05/22 (!) 105/50   12/29/21 (!) 173/79       NPO Status:                            MN                                                    BMI:   Wt Readings from Last 3 Encounters:   12/29/21 143 lb (64.9 kg)   03/01/21 145 lb (65.8 kg)   01/28/21 140 lb (63.5 kg)     There is no height or weight on file to calculate BMI.    CBC:   Lab Results   Component Value Date    WBC 7.9 12/29/2021    RBC 4.56 12/29/2021    HGB 13.0 12/29/2021    HCT 41.8 12/29/2021    MCV 91.7 12/29/2021    RDW 13.4 12/29/2021     12/29/2021       CMP:   Lab Results   Component Value Date     12/29/2021    K 4.0 12/29/2021     12/29/2021    CO2 22 12/29/2021    BUN 22 12/29/2021    CREATININE 0.77 12/29/2021    GFRAA >60 12/29/2021    LABGLOM >60 12/29/2021    GLUCOSE 112 12/29/2021       POC Tests: No results for input(s): POCGLU, POCNA, POCK, POCCL, POCBUN, POCHEMO, POCHCT in the last 72 hours.     Coags:   Lab Results   Component Value Date    PROTIME 10.0 12/29/2021    INR 0.9 12/29/2021    APTT 25.5 12/29/2021       HCG (If Applicable): No results found for: PREGTESTUR, PREGSERUM, HCG, HCGQUANT     ABGs: No results found for: PHART, PO2ART, UAK9XRF, FZF0CIS, BEART, W1XYWCOM     Type & Screen (If Applicable):  No results found for: [facility]ABO, [address]    Drug/Infectious Status (If Applicable):  No results found for: HIV, HEPCAB    COVID-19 Screening (If Applicable): No results found for: COVID19        Anesthesia Evaluation   no history of anesthetic complications:   Airway: Mallampati: II     Neck ROM: full   Dental:          Pulmonary: breath sounds clear to auscultation      (-) recent URI                           Cardiovascular:  Exercise tolerance: good (>4 METS),   (+) hypertension:,         Rhythm: regular  Rate: normal                    Neuro/Psych:   (+) neuromuscular disease:,    (-) seizures           GI/Hepatic/Renal:   (+) renal disease: kidney stones,           Endo/Other:    (+) hypothyroidism: arthritis: OA., .    (-) diabetes mellitus               Abdominal:         (-) obese       Vascular: Other Findings: Temp crowns              Anesthesia Plan      general and MAC     ASA 2       Induction: intravenous. MIPS: Postoperative opioids intended. Anesthetic plan and risks discussed with patient. Plan discussed with CRNA.                 Ammon Contreras MD   1/5/2022

## 2022-01-05 NOTE — SEDATION DOCUMENTATION
Care taken over from [facility]. See CRNA for meds and vitals during procedure. Properly positioned prone on table. Monitors and strap on. Left back is prepped and draped.

## 2022-01-05 NOTE — ANESTHESIA POSTPROCEDURE EVALUATION
Department of Anesthesiology  Postprocedure Note    Patient: Nisa Ledezma  MRN: [id number]  [place]: 1940  Date of evaluation: 1/5/2022  Time:  2:08 PM     Procedure Summary     Date: 01/05/22 Room / Location: [address] [place] [address] 83 Procedures    Anesthesia Start: 4675 Anesthesia Stop: 1326    Procedure: IR URETERAL PLACEMENT STENT&NEPHRO CATH NEW ACCESS Diagnosis: (left wire to bladder)    Scheduled Providers: Stv Interventional Radiologist Responsible Provider: Abdirizak Hamilton MD    Anesthesia Type: general, MAC ASA Status: 2          Anesthesia Type: general, MAC    Hero Phase I:      Hero Phase II:      Last vitals: Reviewed and per EMR flowsheets. Anesthesia Post Evaluation    Patient location during evaluation: bedside  Patient participation: complete - patient participated  Level of consciousness: awake  Pain score: 0  Airway patency: patent  Nausea & Vomiting: no vomiting and no nausea  Complications: no  Cardiovascular status: hemodynamically stable  Respiratory status: acceptable  Hydration status: stable    Patient transferred directly to OR  with stable vital signs.

## 2022-01-05 NOTE — INTERVAL H&P NOTE
Update History & Physical    The patient's History and Physical of December 29, 2021 was reviewed with the patient and I examined the patient. There was no change. The surgical site was confirmed by the patient and me. Plan: Cystoscopy, insertion of occlusive balloon, to IR for access and PCNL LEFT  The risks, benefits, expected outcome, and alternative to the recommended procedure have been discussed with the patient. Patient understands and wants to proceed with the procedure.      Electronically signed by Chioma Benitez PA-C on 1/5/2022 at 10:57 AM

## 2022-01-05 NOTE — ANESTHESIA PRE PROCEDURE
Department of Anesthesiology  Preprocedure Note       Name:  Dov Olvera   Age:  79 y.o.  :  1940                                          MRN:  [id number]         Date:  2022      Surgeon: Gerson Dupree):  Roxana Garcia MD    Procedure: Procedure(s):  CYSTOSCOPY, INSERTION OCCLUSIVE BALLOON  (PT. GOING TO I.R.)    Department of Anesthesiology  Pre-Anesthesia Evaluation/Consultation         Name:  Dov Olvera                                         Age:  79 y.o. MRN:  [id number]             Medications  Current Facility-Administered Medications   Medication Dose Route Frequency Provider Last Rate Last Admin    0.9 % sodium chloride infusion   IntraVENous Continuous Izora Reveal Redfox, PA        ciprofloxacin (CIPRO) IVPB 400 mg  400 mg IntraVENous Once Izora Reveal Redfox, PA        lactated ringers infusion 1,000 mL  1,000 mL IntraVENous Continuous Nilesh Adam MD           Allergies   Allergen Reactions    Seasonal      Patient Active Problem List   Diagnosis    Hypertrophy of breast    Cervicalgia    Backache    Other plastic surgery for unacceptable cosmetic appearance    Elective procedure for unacceptable cosmetic appearance     Past Medical History:   Diagnosis Date    Dental crowns present 2021    TEMPORARY X2 - PT'S DENTIST WANT ANESTHESIA TO BE AWARE.  Hyperlipidemia     Hypertension     Osteoarthritis     Renal calculus     Thyroid disease 2021    hypothyroid    Under care of team     PCP - DR. Trish Kincaid Under care of team 2021    UROLOGY - DR. GHOSH - LAST VISIT 2021    Under care of team 2021    [facility]     Past Surgical History:   Procedure Laterality Date    BREAST SURGERY      cresencio breast reduction- Dr. Suhas Hamilton Bilateral 10/2019    COLONOSCOPY      COSMETIC SURGERY  2021    Laser resurfacing and deep oral and 50/50 ocular- Dr. Joe Salazar     Social History     Tobacco Use    Smoking status: Never Smoker  Smokeless tobacco: Never Used   Substance Use Topics    Alcohol use: Not on file     Comment: NO    Drug use: No         Vital Signs (Current)   Vitals:    22 1033   BP: (!) 183/74   Pulse: 93   Resp: 16   Temp: 97.5 °F (36.4 °C)   SpO2: 97%     Vital Signs Statistics (for past 48 hrs)     Temp  Av.5 °F (36.4 °C)  Min: 97.5 °F (36.4 °C)   Min taken time: 22 1033  Max: 97.5 °F (36.4 °C)   Max taken time: 22 1033  Pulse  Av  Min: 93   Min taken time: 22 1033  Max: 93   Max taken time: 22 1033  Resp  Av  Min: 16   Min taken time: 22 1033  Max: 16   Max taken time: 22 1033  BP  Min: 183/74   Min taken time: 22 1033  Max: 183/74   Max taken time: 22 1033  SpO2  Av %  Min: 97 %   Min taken time: 22 1033  Max: 97 %   Max taken time: 22 1033  BP Readings from Last 3 Encounters:   22 (!) 183/74   21 (!) 173/79   21 (!) 173/84       BMI  Body mass index is 25.74 kg/m². CBC   Lab Results   Component Value Date    WBC 7.9 2021    RBC 4.56 2021    HGB 13.0 2021    HCT 41.8 2021    MCV 91.7 2021    RDW 13.4 2021     2021       CMP    Lab Results   Component Value Date     2021    K 4.0 2021     2021    CO2 22 2021    BUN 22 2021    CREATININE 0.77 2021    GFRAA >60 2021    LABGLOM >60 2021    GLUCOSE 112 2021       BMP    Lab Results   Component Value Date     2021    K 4.0 2021     2021    CO2 22 2021    BUN 22 2021    CREATININE 0.77 2021    GFRAA >60 2021    LABGLOM >60 2021    GLUCOSE 112 2021       POC Testing  No results for input(s): POCGLU, POCNA, POCK, POCCL, POCBUN, POCHEMO, POCHCT in the last 72 hours.     [facility]    Lab Results   Component Value Date    PROTIME 10.0 2021    INR 0.9 2021    APTT 25.5 2021       HCG (If Applicable) No results found for: PREGTESTUR, PREGSERUM, HCG, HCGQUANT     ABGs No results found for: PHART, PO2ART, LJZ0AWU, VQX3QBH, BEART, T4FTLTNA     Type & Screen (If Applicable)  No results found for: LABABO, [address]    Radiology (If Applicable)    Cardiac Testing (If Applicable)     EKG (If Applicable) ? LVH          Medications prior to admission:   Prior to Admission medications    Medication Sig Start Date End Date Taking? Authorizing Provider   atorvastatin (LIPITOR) 20 MG tablet Take 20 mg by mouth daily    Historical Provider, MD   amLODIPine (NORVASC) 2.5 MG tablet Take 2.5 mg by mouth daily. Historical Provider, MD   ezetimibe (ZETIA) 10 MG tablet Take 10 mg by mouth daily. Historical Provider, MD   levothyroxine (SYNTHROID) 25 MCG tablet Take 25 mcg by mouth Daily. Historical Provider, MD   Cholecalciferol (VITAMIN D3) 73514 UNITS CAPS Take by mouth once a week SUNDAYS    Historical Provider, MD   calcium carbonate (OSCAL) 500 MG TABS tablet Take 500 mg by mouth daily. Historical Provider, MD   Coenzyme Q10 (CO Q 10 PO) Take  by mouth. Historical Provider, MD   LISINOPRIL PO Take  by mouth. Historical Provider, MD       Current medications:    Current Facility-Administered Medications   Medication Dose Route Frequency Provider Last Rate Last Admin    0.9 % sodium chloride infusion   IntraVENous Continuous EKATERINA Das        ciprofloxacin (CIPRO) IVPB 400 mg  400 mg IntraVENous Once Harry Tee, [address]        lactated ringers infusion 1,000 mL  1,000 mL IntraVENous Continuous Tyrone Turner MD           Allergies:     Allergies   Allergen Reactions    Seasonal        Problem List:    Patient Active Problem List   Diagnosis Code    Hypertrophy of breast N62    Cervicalgia M54.2    Backache M54.9    Other plastic surgery for unacceptable cosmetic appearance Z41.1    Elective procedure for unacceptable cosmetic appearance Z41.1       Past Medical History:        Diagnosis Date    Dental crowns present 12/29/2021    TEMPORARY X2 - PT'S DENTIST WANT ANESTHESIA TO BE AWARE.  Hyperlipidemia     Hypertension     Osteoarthritis     Renal calculus     Thyroid disease 12/29/2021    hypothyroid    Under care of team     PCP - DR. Rosi Jj Under care of team 12/29/2021    UROLOGY - DR. GHOSH - LAST VISIT 12/2021    Under care of team 12/29/2021    [facility] -  [facility]       Past Surgical History:        Procedure Laterality Date    BREAST SURGERY  2005    cresencio breast reduction- Dr. Sofia Bruner Bilateral 10/2019    COLONOSCOPY      COSMETIC SURGERY  01/28/2021    Laser resurfacing and deep oral and 50/50 ocular- Dr. Neptali Gates       Social History:    Social History     Tobacco Use    Smoking status: Never Smoker    Smokeless tobacco: Never Used   Substance Use Topics    Alcohol use: Not on file     Comment: NO                                Counseling given: Not Answered      Vital Signs (Current):   Vitals:    01/05/22 1000   Height: 5' 2.5\" (1.588 m)                                              BP Readings from Last 3 Encounters:   12/29/21 (!) 173/79   03/01/21 (!) 173/84   11/10/20 130/74       NPO Status:                            MN                                                    BMI:   Wt Readings from Last 3 Encounters:   12/29/21 143 lb (64.9 kg)   03/01/21 145 lb (65.8 kg)   01/28/21 140 lb (63.5 kg)     Body mass index is 25.74 kg/m².     CBC:   Lab Results   Component Value Date    WBC 7.9 12/29/2021    RBC 4.56 12/29/2021    HGB 13.0 12/29/2021    HCT 41.8 12/29/2021    MCV 91.7 12/29/2021    RDW 13.4 12/29/2021     12/29/2021       CMP:   Lab Results   Component Value Date     12/29/2021    K 4.0 12/29/2021     12/29/2021    CO2 22 12/29/2021    BUN 22 12/29/2021    CREATININE 0.77 12/29/2021    GFRAA >60 12/29/2021    LABGLOM >60 12/29/2021    GLUCOSE 112 12/29/2021       POC Tests: No results for input(s): POCGLU, Lanette Her, POCCL, POCBUN, POCHEMO, POCHCT in the last 72 hours. Coags:   Lab Results   Component Value Date    PROTIME 10.0 12/29/2021    INR 0.9 12/29/2021    APTT 25.5 12/29/2021       HCG (If Applicable): No results found for: PREGTESTUR, PREGSERUM, HCG, HCGQUANT     ABGs: No results found for: PHART, PO2ART, GWH6WAV, WCQ2JQI, BEART, X6QUKDSN     Type & Screen (If Applicable):  No results found for: LABABO, LABRH    Drug/Infectious Status (If Applicable):  No results found for: HIV, HEPCAB    COVID-19 Screening (If Applicable): No results found for: COVID19        Anesthesia Evaluation   no history of anesthetic complications:   Airway: Mallampati: II     Neck ROM: full   Dental:          Pulmonary:       (-) recent URI                           Cardiovascular:  Exercise tolerance: good (>4 METS),   (+) hypertension:,                   Neuro/Psych:   (+) neuromuscular disease:,    (-) seizures           GI/Hepatic/Renal:   (+) renal disease: kidney stones,           Endo/Other:    (+) hypothyroidism: arthritis: OA., .    (-) diabetes mellitus               Abdominal:             Vascular: Other Findings: Temp crowns          Anesthesia Plan      general and MAC     ASA 2       Induction: intravenous.                           Sharonda Baxter MD   1/5/2022

## 2022-01-05 NOTE — OP NOTE
Operative Note      Patient: Lonnie Nino  YOB: 1940  MRN: [id number]    Date of Procedure: 1/5/2022    Pre-Op Diagnosis: LEFT KIDNEY STONE    Post-Op Diagnosis: Same       Procedure(s):  CYSTOSCOPY, INSERTION OCCLUSIVE BALLOON    Surgeon(s):  Mukesh Tolbert MD    Assistant:   Cassandra Charlton MD    Anesthesia: Monitor Anesthesia Care    Estimated Blood Loss (mL): Minimal    Complications: None    Specimens:   * No specimens in log *    Implants:  * No implants in log *      Drains:   Urethral Catheter Double-lumen;Straight-tip 16 fr (Active)   Left occlusion balloon catheter    INDICATIONS FOR PROCEDURE:  The patient is a 79 y.o. female with a history of left kidney stone requiring percutaneous nephrolithotomy. She is currently here to undergo left occlusion balloon catheter placement prior to percutaneous access. The risks and benefits of the procedure as well as possible alternatives and complications were discussed and she consented    DETAILS OF THE PROCEDURE:  The patient was correctly identified in the preoperative holding area. she was brought back to the operating room and placed in the dorsal lithotomy position. EPC cuffs were in place, turned on, and fully functional. Monitored Local Anesthesia with Sedation was administered. She was given Cipro 400mg  IV  for antibiotic prophylaxis. The patient was then prepped and draped in the usual sterile fashion. After appropriate time-out was performed with all parties agreeing, 25 Citizen of the [place] cystoscope with a 30 degree lens was inserted through the urethra into the bladder. The left ureteral orifice was localized. A glidewire was inserted through the scope into the ureteral orifice and localized in the left renal pelvis under fluoroscopy. An occlusion balloon catheter was placed over the wire into into the pelvis. It was localized in the correct position under fluoroscopy. A retrograde pyelogram was performed to delineate the collecting system.   A 16 [place] Morse catheter was inserted to the bladder and the balloon filled with 10 cc of water. The occlusion balloon was secured to the Morse using umbilical tape. The patient tolerated the procedure well and was sent to PACU for postoperative monitoring. Dr. David Chen was present for all critical portions of the procedure. DISPOSITION:   She will go to the radiology suite for placement of percutaneous nephrostomy tube and a wire into the urinary collecting system preferably through the left midpole.       Electronically signed by Emi Talamantes MD on 1/5/2022 at 12:29 PM

## 2022-01-05 NOTE — ANESTHESIA PRE PROCEDURE
Department of Anesthesiology  Preprocedure Note       Name:  Tyrell Hassan   Age:  79 y.o.  :  1940                                          MRN:  [id number]         Date:  2022      Surgeon: Jodi Edwards):  Ashanti Benitez MD    Procedure: Procedure(s):  HOLMIUM, PERCUTANEOUS NEPHROLITHOTOMY, C-ARM, LITHOCLAST  (PT. COMING FROM INTERVENTIONAL)    Department of Anesthesiology  Pre-Anesthesia Evaluation/Consultation         Name:  Tyrell Hassan                                         Age:  79 y.o. MRN:  [id number]             Medications  No current facility-administered medications for this visit. No current outpatient medications on file. Facility-Administered Medications Ordered in Other Visits   Medication Dose Route Frequency Provider Last Rate Last Admin    0.9 % sodium chloride infusion   IntraVENous Continuous Brandon Redlatonia PA 20 mL/hr at 22 1112 New Bag at 22 1112    ciprofloxacin (CIPRO) IVPB 400 mg  400 mg IntraVENous Once Bijal Settler Redfox, PA        lactated ringers infusion 1,000 mL  1,000 mL IntraVENous Continuous Sherita Temple MD           Allergies   Allergen Reactions    Seasonal      Patient Active Problem List   Diagnosis    Hypertrophy of breast    Cervicalgia    Backache    Other plastic surgery for unacceptable cosmetic appearance    Elective procedure for unacceptable cosmetic appearance     Past Medical History:   Diagnosis Date    Dental crowns present 2021    TEMPORARY X2 - PT'S DENTIST WANT ANESTHESIA TO BE AWARE.  Hyperlipidemia     Hypertension     Osteoarthritis     Renal calculus     Thyroid disease 2021    hypothyroid    Under care of team     PCP - DR. Aicha Bunch Under care of team 2021    UROLOGY - DR. GHOSH - LAST VISIT 2021    Under care of team 2021    [facility] -   [facility]     Past Surgical History:   Procedure Laterality Date    BREAST SURGERY      cresencio breast reduction- Dr. Ora Nye IMPLANT Bilateral 10/2019    COLONOSCOPY      COSMETIC SURGERY  2021    Laser resurfacing and deep oral and 50/50 ocular- Dr. James Lee     Social History     Tobacco Use    Smoking status: Never Smoker    Smokeless tobacco: Never Used   Substance Use Topics    Alcohol use: Not on file     Comment: NO    Drug use: No         Vital Signs (Current)   There were no vitals filed for this visit. Vital Signs Statistics (for past 48 hrs)     Temp  Av.5 °F (36.4 °C)  Min: 97.5 °F (36.4 °C)   Min taken time: 22 1033  Max: 97.5 °F (36.4 °C)   Max taken time: 22 1033  Pulse  Av  Min: 93   Min taken time: 22 1033  Max: 93   Max taken time: 22 1033  Resp  Av  Min: 16   Min taken time: 22 1033  Max: 16   Max taken time: 22 1033  BP  Min: 183/74   Min taken time: 22 1033  Max: 183/74   Max taken time: 22 1033  SpO2  Av %  Min: 97 %   Min taken time: 22 1033  Max: 97 %   Max taken time: 22 1033  BP Readings from Last 3 Encounters:   22 (!) 183/74   21 (!) 173/79   21 (!) 173/84       BMI  There is no height or weight on file to calculate BMI.     CBC   Lab Results   Component Value Date    WBC 7.9 2021    RBC 4.56 2021    HGB 13.0 2021    HCT 41.8 2021    MCV 91.7 2021    RDW 13.4 2021     2021       CMP    Lab Results   Component Value Date     2021    K 4.0 2021     2021    CO2 22 2021    BUN 22 2021    CREATININE 0.77 2021    GFRAA >60 2021    LABGLOM >60 2021    GLUCOSE 112 2021       BMP    Lab Results   Component Value Date     2021    K 4.0 2021     2021    CO2 22 2021    BUN 22 2021    CREATININE 0.77 2021    GFRAA >60 2021    LABGLOM >60 2021    GLUCOSE 112 2021       POC Testing  No results for input(s): POCGLU, POCNA, POCK, POCCL, POCBUN, Kasie Ilene in the last 72 hours. Coags    Lab Results   Component Value Date    PROTIME 10.0 12/29/2021    INR 0.9 12/29/2021    APTT 25.5 12/29/2021       HCG (If Applicable) No results found for: PREGTESTUR, PREGSERUM, HCG, HCGQUANT     ABGs No results found for: PHART, PO2ART, RPH7LAC, DTU5NOW, BEART, I1UIZDEY     Type & Screen (If Applicable)  No results found for: LABABO, [address]    Radiology (If Applicable)    Cardiac Testing (If Applicable)     EKG (If Applicable) ? LV[facility]          Medications prior to admission:   Prior to Admission medications    Medication Sig Start Date End Date Taking? Authorizing Provider   atorvastatin (LIPITOR) 20 MG tablet Take 20 mg by mouth daily    Historical Provider, MD   amLODIPine (NORVASC) 2.5 MG tablet Take 2.5 mg by mouth daily. Historical Provider, MD   levothyroxine (SYNTHROID) 25 MCG tablet Take 25 mcg by mouth Daily. Historical Provider, MD   Cholecalciferol (VITAMIN D3) 12725 UNITS CAPS Take by mouth once a week SUNDAYS    Historical Provider, MD   calcium carbonate (OSCAL) 500 MG TABS tablet Take 500 mg by mouth daily. Historical Provider, MD   Coenzyme Q10 (CO Q 10 PO) Take  by mouth. Historical Provider, MD   LISINOPRIL PO Take  by mouth. Historical Provider, MD       Current medications:    No current facility-administered medications for this visit. No current outpatient medications on file. Facility-Administered Medications Ordered in Other Visits   Medication Dose Route Frequency Provider Last Rate Last Admin    0.9 % sodium chloride infusion   IntraVENous Continuous EKATERINA Reid 20 mL/hr at 01/05/22 1112 New Bag at 01/05/22 1112    ciprofloxacin (CIPRO) IVPB 400 mg  400 mg IntraVENous Once Ignacio EKATERINA King        lactated ringers infusion 1,000 mL  1,000 mL IntraVENous Continuous Cary Curling, MD           Allergies:     Allergies   Allergen Reactions    Seasonal        Problem List:    Patient Active Problem List   Diagnosis Code    Hypertrophy of breast N62    Cervicalgia M54.2    Backache M54.9    Other plastic surgery for unacceptable cosmetic appearance Z41.1    Elective procedure for unacceptable cosmetic appearance Z41.1       Past Medical History:        Diagnosis Date    Dental crowns present 12/29/2021    TEMPORARY X2 - PT'S DENTIST WANT ANESTHESIA TO BE AWARE.  Hyperlipidemia     Hypertension     Osteoarthritis     Renal calculus     Thyroid disease 12/29/2021    hypothyroid    Under care of team     PCP - DR. Trish Kincaid Under care of team 12/29/2021    UROLOGY - DR. GHOSH - LAST VISIT 12/2021    Under care of team 12/29/2021    [facility] -  [facility]       Past Surgical History:        Procedure Laterality Date    BREAST SURGERY  2005    cresencio breast reduction- Dr. Suhas Hamilton Bilateral 10/2019    COLONOSCOPY      COSMETIC SURGERY  01/28/2021    Laser resurfacing and deep oral and 50/50 ocular- Dr. Joe Salazar       Social History:    Social History     Tobacco Use    Smoking status: Never Smoker    Smokeless tobacco: Never Used   Substance Use Topics    Alcohol use: Not on file     Comment: NO                                Counseling given: Not Answered      Vital Signs (Current): There were no vitals filed for this visit.                                            BP Readings from Last 3 Encounters:   01/05/22 (!) 183/74   12/29/21 (!) 173/79   03/01/21 (!) 173/84       NPO Status:                            MN                                                    BMI:   Wt Readings from Last 3 Encounters:   12/29/21 143 lb (64.9 kg)   03/01/21 145 lb (65.8 kg)   01/28/21 140 lb (63.5 kg)     There is no height or weight on file to calculate BMI.    CBC:   Lab Results   Component Value Date    WBC 7.9 12/29/2021    RBC 4.56 12/29/2021    HGB 13.0 12/29/2021    HCT 41.8 12/29/2021    MCV 91.7 12/29/2021    RDW 13.4 12/29/2021     12/29/2021       CMP:   Lab Results   Component Value Date     12/29/2021    K 4.0 12/29/2021     12/29/2021    CO2 22 12/29/2021    BUN 22 12/29/2021    CREATININE 0.77 12/29/2021    GFRAA >60 12/29/2021    LABGLOM >60 12/29/2021    GLUCOSE 112 12/29/2021       POC Tests: No results for input(s): POCGLU, POCNA, POCK, POCCL, POCBUN, POCHEMO, POCHCT in the last 72 hours. Coags:   Lab Results   Component Value Date    PROTIME 10.0 12/29/2021    INR 0.9 12/29/2021    APTT 25.5 12/29/2021       HCG (If Applicable): No results found for: PREGTESTUR, PREGSERUM, HCG, HCGQUANT     ABGs: No results found for: PHART, PO2ART, DHJ6CCM, DWZ0LVB, BEART, G8SXAXWC     Type & Screen (If Applicable):  No results found for: LABABO, LABRH    Drug/Infectious Status (If Applicable):  No results found for: HIV, HEPCAB    COVID-19 Screening (If Applicable): No results found for: COVID19        Anesthesia Evaluation   no history of anesthetic complications:   Airway: Mallampati: II     Neck ROM: full   Dental:          Pulmonary: breath sounds clear to auscultation      (-) recent URI                           Cardiovascular:  Exercise tolerance: good (>4 METS),   (+) hypertension:,         Rhythm: regular  Rate: normal                    Neuro/Psych:   (+) neuromuscular disease:,    (-) seizures           GI/Hepatic/Renal:   (+) renal disease: kidney stones,           Endo/Other:    (+) hypothyroidism: arthritis: OA., .    (-) diabetes mellitus               Abdominal:         (-) obese       Vascular: Other Findings: Temp crowns              Anesthesia Plan      general     ASA 2       Induction: intravenous.                           Dylan Manning MD   1/5/2022

## 2022-01-05 NOTE — ANESTHESIA POSTPROCEDURE EVALUATION
Department of Anesthesiology  Postprocedure Note    Patient: Sayda Staples  MRN: [id number]  [place]: 1940  Date of evaluation: 1/5/2022  Time:  3:34 PM     Procedure Summary     Date: 01/05/22 Room / Location: [address]    Anesthesia Start: 9454 Anesthesia Stop: 1244    Procedure: CYSTOSCOPY, INSERTION OCCLUSIVE BALLOON (Left ) Diagnosis: (LEFT KIDNEY STONE)    Surgeons: Muriel Beltran MD Responsible Provider: Thu Calhoun MD    Anesthesia Type: general, MAC ASA Status: 2          Anesthesia Type: general, MAC    Hero Phase I:      Hero Phase II:      Last vitals: Reviewed and per EMR flowsheets.        Anesthesia Post Evaluation    Patient location during evaluation: PACU  Patient participation: complete - patient participated  Level of consciousness: awake  Pain score: 7  Nausea & Vomiting: no vomiting  Cardiovascular status: hemodynamically stable  Respiratory status: face mask  Multimodal analgesia pain management approach

## 2022-01-06 ENCOUNTER — APPOINTMENT (OUTPATIENT)
Dept: CT IMAGING | Age: 82
End: 2022-01-06
Attending: UROLOGY
Payer: MEDICARE

## 2022-01-06 LAB
HCT VFR BLD CALC: 33.2 % (ref 36.3–47.1)
HEMOGLOBIN: 10.5 G/DL (ref 11.9–15.1)
MCH RBC QN AUTO: 28.8 PG (ref 25.2–33.5)
MCHC RBC AUTO-ENTMCNC: 31.6 G/DL (ref 28.4–34.8)
MCV RBC AUTO: 91.2 FL (ref 82.6–102.9)
NRBC AUTOMATED: 0 PER 100 WBC
PDW BLD-RTO: 12.9 % (ref 11.8–14.4)
PLATELET # BLD: 209 K/UL (ref 138–453)
PMV BLD AUTO: 12.1 FL (ref 8.1–13.5)
RBC # BLD: 3.64 M/UL (ref 3.95–5.11)
WBC # BLD: 10.8 K/UL (ref 3.5–11.3)

## 2022-01-06 PROCEDURE — 6360000002 HC RX W HCPCS

## 2022-01-06 PROCEDURE — 85027 COMPLETE CBC AUTOMATED: CPT

## 2022-01-06 PROCEDURE — 2580000003 HC RX 258: Performed by: PHYSICIAN ASSISTANT

## 2022-01-06 PROCEDURE — 6370000000 HC RX 637 (ALT 250 FOR IP): Performed by: STUDENT IN AN ORGANIZED HEALTH CARE EDUCATION/TRAINING PROGRAM

## 2022-01-06 PROCEDURE — 74176 CT ABD & PELVIS W/O CONTRAST: CPT

## 2022-01-06 PROCEDURE — G0379 DIRECT REFER HOSPITAL OBSERV: HCPCS

## 2022-01-06 PROCEDURE — 80048 BASIC METABOLIC PNL TOTAL CA: CPT

## 2022-01-06 PROCEDURE — G0378 HOSPITAL OBSERVATION PER HR: HCPCS

## 2022-01-06 RX ORDER — DIPHENHYDRAMINE HCL 25 MG
25 TABLET ORAL EVERY 6 HOURS PRN
Status: DISCONTINUED | OUTPATIENT
Start: 2022-01-06 | End: 2022-01-07 | Stop reason: HOSPADM

## 2022-01-06 RX ADMIN — OXYCODONE HYDROCHLORIDE AND ACETAMINOPHEN 1 TABLET: 5; 325 TABLET ORAL at 04:46

## 2022-01-06 RX ADMIN — SODIUM CHLORIDE: 9 INJECTION, SOLUTION INTRAVENOUS at 21:37

## 2022-01-06 RX ADMIN — DIPHENHYDRAMINE HCL 25 MG: 25 TABLET ORAL at 07:26

## 2022-01-06 RX ADMIN — AMLODIPINE BESYLATE 2.5 MG: 2.5 TABLET ORAL at 08:30

## 2022-01-06 RX ADMIN — LEVOTHYROXINE SODIUM 25 MCG: 25 TABLET ORAL at 08:30

## 2022-01-06 RX ADMIN — OXYCODONE HYDROCHLORIDE AND ACETAMINOPHEN 2 TABLET: 5; 325 TABLET ORAL at 08:37

## 2022-01-06 RX ADMIN — ATORVASTATIN CALCIUM 20 MG: 20 TABLET, FILM COATED ORAL at 08:30

## 2022-01-06 RX ADMIN — SODIUM CHLORIDE: 9 INJECTION, SOLUTION INTRAVENOUS at 10:44

## 2022-01-06 RX ADMIN — OXYCODONE HYDROCHLORIDE AND ACETAMINOPHEN 2 TABLET: 5; 325 TABLET ORAL at 15:38

## 2022-01-06 RX ADMIN — HYDROMORPHONE HYDROCHLORIDE 0.5 MG: 1 INJECTION, SOLUTION INTRAMUSCULAR; INTRAVENOUS; SUBCUTANEOUS at 08:00

## 2022-01-06 ASSESSMENT — PAIN SCALES - GENERAL
PAINLEVEL_OUTOF10: 5
PAINLEVEL_OUTOF10: 6
PAINLEVEL_OUTOF10: 5
PAINLEVEL_OUTOF10: 9
PAINLEVEL_OUTOF10: 7
PAINLEVEL_OUTOF10: 0
PAINLEVEL_OUTOF10: 9
PAINLEVEL_OUTOF10: 9

## 2022-01-06 ASSESSMENT — PAIN DESCRIPTION - ORIENTATION
ORIENTATION: LEFT
ORIENTATION: LEFT

## 2022-01-06 ASSESSMENT — PAIN DESCRIPTION - PAIN TYPE
TYPE: SURGICAL PAIN
TYPE: SURGICAL PAIN

## 2022-01-06 ASSESSMENT — PAIN DESCRIPTION - LOCATION
LOCATION: FLANK
LOCATION: FLANK

## 2022-01-06 NOTE — DISCHARGE SUMMARY
DISCHARGE SUMMARY NOTE:      Patient Identification  PATIENT: Ebony Trinidad is a 79 y.o. female. MRN: [id number]  :  1940  Admit Date:  2022  Discharge date:   2022  3:27 PM                                   Disposition: home  Discharged Condition:  good  Discharge Diagnoses:   Patient Active Problem List   Diagnosis    Hypertrophy of breast    Cervicalgia    Backache    Other plastic surgery for unacceptable cosmetic appearance    Elective procedure for unacceptable cosmetic appearance    Left renal stone       Consults: none    Surgery: Left-sided percutaneous nephrolithotomy    Patient Instructions: Activity: As tolerated, ambulate, hydrate vigorously with at least 2 L/day  Diet: As tolerated  Patient told to follow up with 6 weeks with imaging prior   Discharge Medications:      Medication List      START taking these medications    ciprofloxacin 500 MG tablet  Commonly known as: CIPRO  Take 1 tablet by mouth 2 times daily for 3 days     oxyCODONE-acetaminophen 5-325 MG per tablet  Commonly known as: Percocet  Take 1 tablet by mouth every 6 hours as needed for Pain for up to 3 days. May take 2 tablets if needed     polyethylene glycol 17 GM/SCOOP powder  Commonly known as: GLYCOLAX  Take 17 g by mouth daily as needed (constipation from pain med)        CONTINUE taking these medications    amLODIPine 2.5 MG tablet  Commonly known as: NORVASC     atorvastatin 20 MG tablet  Commonly known as: LIPITOR     CO Q 10 PO     levothyroxine 25 MCG tablet  Commonly known as: SYNTHROID     LISINOPRIL PO     Vitamin D3 1.25 MG (63017 UT) Caps     ZOLOFT PO        STOP taking these medications    calcium carbonate 500 MG Tabs tablet  Commonly known as: OSCAL     POTASSIUM PO           Where to Get Your Medications      These medications were sent to The [address] - [address] Kristal Rebollar [phone] - F [phone]  [address] [place] [address]    Phone: [phone]   · ciprofloxacin 500 MG tablet  · oxyCODONE-acetaminophen 5-325 MG per tablet  · polyethylene glycol 17 GM/SCOOP powder         Hospital course:  Patient is an 71-year-old female with history of left staghorn calculi who presented for left-sided percutaneous nephrolithotomy on 1/5/2022. She tolerated procedure well with no difficulty. On postoperative day 1 her nephrostomy tube, Morse catheter was removed and she voided without difficulty. On postoperative day 2 she was deemed appropriate for discharge as her pain was under control, she was tolerating diet with no issues, she was ambulating without difficulty, was afebrile with vital signs stable. Patient will follow-up in 6 weeks with imaging prior.     Modesto Mary MD  3:49 PM 1/7/2022

## 2022-01-06 NOTE — PROGRESS NOTES
Liam Marcella, [address] [facility], [address], [place]  Urology Progress Note     Subjective:   Status post left percutaneous nephrolithotomy. Postop day 1  Afebrile, vital signs stable overnight  Denies any fevers, chills, nausea or vomiting  Denies any chest pain or shortness of breath  Minimal ambulation  Morse catheter and nephrostomy tube in place with serosanguineous output    Urine output- 900 cc over the last 12 hours    Patient Vitals for the past 24 hrs:   BP Temp Temp src Pulse Resp SpO2 Height   01/06/22 0554 -- -- -- 58 -- -- --   01/06/22 0400 108/78 98.1 °F (36.7 °C) Temporal 60 16 97 % --   01/06/22 0100 -- -- -- 61 -- -- --   01/06/22 0002 (!) 88/45 -- -- 59 18 97 % --   01/05/22 2015 135/60 98.1 °F (36.7 °C) Temporal 60 22 96 % --   01/05/22 1730 -- -- -- -- -- 99 % --   01/05/22 1630 (!) 146/63 -- -- 66 16 98 % --   01/05/22 1615 (!) 128/53 -- -- -- -- (!) 86 % --   01/05/22 1600 (!) 153/59 97.3 °F (36.3 °C) Temporal 63 14 98 % --   01/05/22 1545 138/61 -- -- 65 16 97 % --   01/05/22 1530 (!) 159/60 -- -- 75 25 99 % --   01/05/22 1515 (!) 150/81 -- -- 87 13 100 % --   01/05/22 1500 (!) 153/70 97.8 °F (36.6 °C) Temporal 82 17 97 % --   01/05/22 1033 (!) 183/74 97.5 °F (36.4 °C) Temporal 93 16 97 % --   01/05/22 1000 -- -- -- -- -- -- 5' 2.5\" (1.588 m)       Intake/Output Summary (Last 24 hours) at 1/6/2022 0803  Last data filed at 1/6/2022 0541  Gross per 24 hour   Intake 2925 ml   Output 1000 ml   Net 1925 ml       Recent Labs     01/05/22  1544 01/06/22  0448   WBC 7.6 10.8   HGB 12.9 10.5*   HCT 40.2 33.2*   MCV 89.7 91.2    209     Recent Labs     01/05/22  1544      K 4.3      CO2 19*   PHOS 3.6   BUN 17   CREATININE 0.84       No results for input(s): COLORU, PHUR, LABCAST, WBCUA, RBCUA, MUCUS, TRICHOMONAS, YEAST, BACTERIA, CLARITYU, SPECGRAV, LEUKOCYTESUR, UROBILINOGEN, BILIRUBINUR, BLOODU in the last 72 hours.     Invalid input(s): NITRATE, GLUCOSEUKETONESUAMORPHOUS    Additional Lab/culture results: None    Physical Exam:   AAOx3  NAD  Unlabored breathing  Normal rate  Abdomen soft, appropriately tender to palpation, nondistended  : Morse in place draining marissa-tinged urine. Nephrostomy tube in place with light pink serosanguineous output  No calf tenderness to palpation     Interval Imaging Findings: None    Impression:   79 y.o. female POD #1 status post left percutaneous nephrolithotomy    Plan:   F/u AM labs; trend hemoglobin, replete electrolytes as needed  Clamp nephrostomy tube, remove Morse catheter.   BladderScan patient after void to ensure complete emptying  If tolerates clamp trial, remove nephrostomy tube  Follow-up on CT scan to assess residual stone burden  Ambulate/out of bed  Incentive spirometry   Pain control  Regular diet   Bowel regimen  Discharge planning      Tomy Wheeler MD  PGY-3,Urology  8:03 AM 1/6/2022

## 2022-01-07 VITALS
SYSTOLIC BLOOD PRESSURE: 117 MMHG | RESPIRATION RATE: 16 BRPM | HEART RATE: 64 BPM | OXYGEN SATURATION: 90 % | TEMPERATURE: 97.9 F | DIASTOLIC BLOOD PRESSURE: 49 MMHG | BODY MASS INDEX: 25.74 KG/M2 | HEIGHT: 63 IN

## 2022-01-07 LAB
ANION GAP SERPL CALCULATED.3IONS-SCNC: 16 MMOL/L (ref 9–17)
ANION GAP SERPL CALCULATED.3IONS-SCNC: 7 MMOL/L (ref 9–17)
BUN BLDV-MCNC: 22 MG/DL (ref 8–23)
BUN BLDV-MCNC: 25 MG/DL (ref 8–23)
BUN/CREAT BLD: ABNORMAL (ref 9–20)
BUN/CREAT BLD: ABNORMAL (ref 9–20)
CALCIUM SERPL-MCNC: 8.8 MG/DL (ref 8.6–10.4)
CALCIUM SERPL-MCNC: 9 MG/DL (ref 8.6–10.4)
CHLORIDE BLD-SCNC: 105 MMOL/L (ref 98–107)
CHLORIDE BLD-SCNC: 112 MMOL/L (ref 98–107)
CO2: 18 MMOL/L (ref 20–31)
CO2: 22 MMOL/L (ref 20–31)
CREAT SERPL-MCNC: 1.13 MG/DL (ref 0.5–0.9)
CREAT SERPL-MCNC: 1.19 MG/DL (ref 0.5–0.9)
GFR AFRICAN AMERICAN: 53 ML/MIN
GFR AFRICAN AMERICAN: 56 ML/MIN
GFR NON-AFRICAN AMERICAN: 43 ML/MIN
GFR NON-AFRICAN AMERICAN: 46 ML/MIN
GFR SERPL CREATININE-BSD FRML MDRD: ABNORMAL ML/MIN/{1.73_M2}
GLUCOSE BLD-MCNC: 122 MG/DL (ref 70–99)
GLUCOSE BLD-MCNC: 149 MG/DL (ref 70–99)
HCT VFR BLD CALC: 32.3 % (ref 36.3–47.1)
HEMOGLOBIN: 10.1 G/DL (ref 11.9–15.1)
MCH RBC QN AUTO: 28.8 PG (ref 25.2–33.5)
MCHC RBC AUTO-ENTMCNC: 31.3 G/DL (ref 28.4–34.8)
MCV RBC AUTO: 92 FL (ref 82.6–102.9)
NRBC AUTOMATED: 0 PER 100 WBC
PDW BLD-RTO: 13.7 % (ref 11.8–14.4)
PLATELET # BLD: 191 K/UL (ref 138–453)
PMV BLD AUTO: 12.1 FL (ref 8.1–13.5)
POTASSIUM SERPL-SCNC: 3.9 MMOL/L (ref 3.7–5.3)
POTASSIUM SERPL-SCNC: 4.3 MMOL/L (ref 3.7–5.3)
RBC # BLD: 3.51 M/UL (ref 3.95–5.11)
SODIUM BLD-SCNC: 139 MMOL/L (ref 135–144)
SODIUM BLD-SCNC: 141 MMOL/L (ref 135–144)
WBC # BLD: 8.2 K/UL (ref 3.5–11.3)

## 2022-01-07 PROCEDURE — 6370000000 HC RX 637 (ALT 250 FOR IP): Performed by: STUDENT IN AN ORGANIZED HEALTH CARE EDUCATION/TRAINING PROGRAM

## 2022-01-07 PROCEDURE — 80048 BASIC METABOLIC PNL TOTAL CA: CPT

## 2022-01-07 PROCEDURE — 36415 COLL VENOUS BLD VENIPUNCTURE: CPT

## 2022-01-07 PROCEDURE — 85027 COMPLETE CBC AUTOMATED: CPT

## 2022-01-07 PROCEDURE — G0378 HOSPITAL OBSERVATION PER HR: HCPCS

## 2022-01-07 PROCEDURE — 6360000002 HC RX W HCPCS: Performed by: STUDENT IN AN ORGANIZED HEALTH CARE EDUCATION/TRAINING PROGRAM

## 2022-01-07 PROCEDURE — 96374 THER/PROPH/DIAG INJ IV PUSH: CPT

## 2022-01-07 RX ORDER — KETOROLAC TROMETHAMINE 30 MG/ML
15 INJECTION, SOLUTION INTRAMUSCULAR; INTRAVENOUS ONCE
Status: COMPLETED | OUTPATIENT
Start: 2022-01-07 | End: 2022-01-07

## 2022-01-07 RX ADMIN — ATORVASTATIN CALCIUM 20 MG: 20 TABLET, FILM COATED ORAL at 08:23

## 2022-01-07 RX ADMIN — OXYCODONE HYDROCHLORIDE AND ACETAMINOPHEN 2 TABLET: 5; 325 TABLET ORAL at 02:53

## 2022-01-07 RX ADMIN — OXYCODONE HYDROCHLORIDE AND ACETAMINOPHEN 2 TABLET: 5; 325 TABLET ORAL at 06:59

## 2022-01-07 RX ADMIN — LEVOTHYROXINE SODIUM 25 MCG: 25 TABLET ORAL at 05:30

## 2022-01-07 RX ADMIN — KETOROLAC TROMETHAMINE 15 MG: 30 INJECTION, SOLUTION INTRAMUSCULAR; INTRAVENOUS at 06:59

## 2022-01-07 RX ADMIN — AMLODIPINE BESYLATE 2.5 MG: 2.5 TABLET ORAL at 08:23

## 2022-01-07 ASSESSMENT — PAIN SCALES - GENERAL
PAINLEVEL_OUTOF10: 10
PAINLEVEL_OUTOF10: 7

## 2022-01-07 NOTE — PROGRESS NOTES
[facility] Jon[facility] Lloyd  Urology Progress Note     Subjective:   Status post left percutaneous nephrolithotomy. Postop day 2  Afebrile, vital signs stable overnight  Complaining of left lower abdominal pain  Denies any fevers, chills, nausea or vomiting  Denies any chest pain or shortness of breath  Ambulated to bathroom yesterday    No charted UOP    Patient Vitals for the past 24 hrs:   BP Temp Temp src Pulse Resp SpO2   01/06/22 1941 (!) 117/41 -- -- 68 -- (!) 88 %   01/1939 (!) 122/40 99.2 °F (37.3 °C) Oral 70 16 (!) 85 %   01/06/22 1045 (!) 118/53 98.7 °F (37.1 °C) Oral 65 17 --   01/06/22 0800 (!) 124/51 -- -- 69 16 --     No intake or output data in the 24 hours ending 01/07/22 0620    Recent Labs     01/05/22  1544 01/06/22  0448 01/07/22  0532   WBC 7.6 10.8 8.2   HGB 12.9 10.5* 10.1*   HCT 40.2 33.2* 32.3*   MCV 89.7 91.2 92.0    209 191     Recent Labs     01/05/22  1544 01/06/22  0448 01/07/22  0532    139 141   K 4.3 4.3 3.9    105 112*   CO2 19* 18* 22   PHOS 3.6  --   --    BUN 17 22 25*   CREATININE 0.84 1.19* 1.13*       No results for input(s): COLORU, PHUR, LABCAST, WBCUA, RBCUA, MUCUS, TRICHOMONAS, YEAST, BACTERIA, CLARITYU, SPECGRAV, LEUKOCYTESUR, UROBILINOGEN, BILIRUBINUR, BLOODU in the last 72 hours.     Invalid input(s): NITRATE, GLUCOSEUKETONESUAMORPHOUS    Additional Lab/culture results: None    Physical Exam:   AAOx3  NAD  Unlabored breathing  Normal rate  Abdomen soft, appropriately tender to palpation, nondistended  : No ospina  No calf tenderness to palpation     Interval Imaging Findings: None    Impression:   79 y.o. female POD #2 status post left percutaneous nephrolithotomy    Plan:   IV toradol x1  F/u AM labs; trend hemoglobin, replete electrolytes as needed  Ambulate/out of bed  Incentive spirometry   Pain control  Regular diet   Bowel regimen  Discharge planning      Rob Haines MD  PGY-3,Urology  6:20 AM 1/7/2022

## 2022-01-09 LAB
STONE COMPOSITION: NORMAL
STONE DESCRIPTION: NORMAL
STONE MASS: 506 MG

## 2022-01-11 NOTE — ANESTHESIA POSTPROCEDURE EVALUATION
Department of Anesthesiology  Postprocedure Note    Patient: Veronique Golden  MRN: [id number]  [place]: 1940  Date of evaluation: 1/11/2022  Time:  11:44 AM     Procedure Summary     Date: 01/05/22 Room / Location: [address]    Anesthesia Start: 6024 Anesthesia Stop: 3047    Procedure: HOLMIUM -STAND-BY, PERCUTANEOUS NEPHROLITHOTOMY, C-ARM, LITHOCLAST  (PT. COMING FROM INTERVENTIONAL) (Left Back) Diagnosis: (LEFT KIDNEY STONE)    Surgeons: Brian Nayak MD Responsible Provider: Radha Dunlap MD    Anesthesia Type: general ASA Status: 2          Anesthesia Type: general    Hero Phase I: Hero Score: 10    Hero Phase II:      Last vitals: Reviewed and per EMR flowsheets.        Anesthesia Post Evaluation    Patient location during evaluation: PACU  Patient participation: complete - patient participated  Level of consciousness: awake and alert  Pain score: 2  Airway patency: patent  Nausea & Vomiting: no nausea and no vomiting  Complications: no  Cardiovascular status: hemodynamically stable  Respiratory status: acceptable  Hydration status: euvolemic

## 2022-10-18 RX ORDER — SODIUM CHLORIDE, SODIUM LACTATE, POTASSIUM CHLORIDE, CALCIUM CHLORIDE 600; 310; 30; 20 MG/100ML; MG/100ML; MG/100ML; MG/100ML
1000 INJECTION, SOLUTION INTRAVENOUS CONTINUOUS
Status: CANCELLED | OUTPATIENT
Start: 2022-10-18

## 2022-10-18 NOTE — DISCHARGE INSTRUCTIONS
Pre-operative Instructions    Please arrive at the surgery center by 7:00 AM on 11/3/2022  (or as directed by your surgeon's office). See Directons to Surgery Center below. FASTING    NOTHING TO EAT OR DRINK AFTER MIDNIGHT the night prior to surgery (This includes gum, candy, mints, chewing tobacco, etc). MEDICATIONS    What to STOP: ANY BLOOD THINNING MEDICATION(S) as directed by your surgeon or prescribing physician. FAILURE TO STOP CERTAIN MEDICATIONS MAY INTERFERE WITH YOUR SCHEDULED SURGERY. According to the medication list you provided today, PLEASE STOP:     2. What to CONTINUE leading up to your surgery:   Please take all your other daily medications except the medications listed above that you were instructed to hold. 3. What to TAKE MORNING OF SURGERY with SMALL SIP OF WATER: amlodipine (Norvasc), levothyroxine (Synthroid)                       IF APPLICABLE:  -If you have been given a blood band, you must bring it with you the day of surgery, unclasped.  -Use routine inhalers and bring inhalers the day of surgery.   -Bring C-Pap/Bi-pap with you morning of surgery if planning on staying in the hospital overnight.  -Do not take diabetic medications on the day of surgery. OTHER IMPORTANT REMINDERS    1) Please REMEMBER to get Covid-19 Screening if scheduled    2) You may be required to provide a urine sample upon your arrival to the pre-op area, so please take this into consideration. 3) If  NOT planning on staying in the hospital overnight : A. You will need an adult family member /friend to drive you home after your procedure. Taxi cabs or any form of public transportation ALONE is not acceptable.   -Your  must be 25years of age or older and able to sign off on your discharge instructions.     -It is preferable that the friend or family member stay at the hospital throughout your procedure.   Perfecto Gonzalez must remain with you once you have arrived home for the first 24 hours after your surgery if you receive anesthesia or medication. If you do not have someone to stay with you, your procedure may be cancelled. 4) Do not wear any jewelry or body piercings day of surgery. 5) In case of illness - If you have cold or flu like symptoms (high fever, runny nose, sore throat, cough, etc.) rash, nausea, vomiting, loose stools, and/or recent contact with someone who has a contagious disease (Covid-19, chicken pox, measles, etc.) PLEASE notify your surgeon as soon as possible. 10/18/22  1:43 PM      ___________________  _______________________  Signature (Provider)              Signature (Patient)     Day of Surgery/Procedure    As a patient at [facility] you can expect quality medical and nursing care that is centered on your individual needs. Our goal is to make your surgical experience as comfortable as possible  . Directions to the [address] [place] [address] is located at [address] [facility] [place], [address] [facility]. Please pull into the Emergency/Surgery Center parking lot or there is additional parking across the street. You will enter the facility under through the glass doors and proceed to registration check-in which is right inside the door. Thereafter you will be directed to the [address] [place] [address]. Patient Instructions    ·Please shower the night before and the morning of surgery with an antibacterial soap. Please use the cleaning solution (bottle) given to you the night before your surgery after your shower. Unless otherwise told by your physician, please do not shave legs or any part of your body below your neck the night before or day of your surgery. You may shave your face or neck. ·Please wear loose, comfortable clothing. If you are potentially going to have a cast or brace bring clothing that will fit over them.       ·Bring a list of all medications you take, along with the dose of the medications and how often you take it. If more convenient bring the pharmacy bottles in a zip lock bag. ·Brush your teeth but do not swallow water. ·Bring your eyeglasses and case with you. No contacts are to be worn the day of surgery. You also may bring your hearing aids. ·Do not bring any valuables, such as jewelry, cash or credit cards. If you are staying overnight with us, please bring a SMALL bag of personal items. We cannot accommodate large items, like suitcases. ·If your child is having surgery please make arrangements for any other children to be cared for at home on the day of surgery. Other children are not permitted in recovery room and we want you to be able to spend time with the patient. If other arrangements are not available then we suggest that you have a second adult to stay in the waiting room. ·If you are having any type of anesthesia you are to have nothing to eat or drink after midnight the night before your surgery. This includes gum, mints, water or smoking or chewing tobacco.  The only exception to this is a small sip of water to take with any morning dose of heart, blood pressure, or seizure medications. ·Bring your inhaler if you are currently using one. ·Bring your blood band if one has been given to you. Please do not close the clasp. ·If you are on C-PAP or Bi-PAP at home and plan on staying in the hospital overnight for your surgery please bring the machine with you. ·Do not wear any jewelry or body piercings day of surgery. Also, NO lotion, perfume or deodorant to be used the day of surgery. If you have any other questions regarding your procedure/surgery please call  your surgeon's office.      If you have a last minute question(s) the DAY OF your surgery, you may call [phone]

## 2022-10-20 ENCOUNTER — HOSPITAL ENCOUNTER (OUTPATIENT)
Dept: PREADMISSION TESTING | Age: 82
Discharge: HOME | End: 2022-10-24
Payer: MEDICARE

## 2022-10-20 VITALS
OXYGEN SATURATION: 95 % | RESPIRATION RATE: 16 BRPM | HEIGHT: 62 IN | WEIGHT: 145 LBS | HEART RATE: 73 BPM | TEMPERATURE: 98.5 F | BODY MASS INDEX: 26.68 KG/M2 | DIASTOLIC BLOOD PRESSURE: 65 MMHG | SYSTOLIC BLOOD PRESSURE: 158 MMHG

## 2022-10-20 LAB
ANION GAP SERPL CALCULATED.3IONS-SCNC: 14 MMOL/L (ref 9–17)
BUN BLDV-MCNC: 25 MG/DL (ref 8–23)
CHLORIDE BLD-SCNC: 101 MMOL/L (ref 98–107)
CO2: 25 MMOL/L (ref 20–31)
CREAT SERPL-MCNC: 0.77 MG/DL (ref 0.5–0.9)
GFR SERPL CREATININE-BSD FRML MDRD: >60 ML/MIN/1.73M2
GLUCOSE BLD-MCNC: 116 MG/DL (ref 70–99)
HCT VFR BLD CALC: 39 % (ref 36.3–47.1)
HEMOGLOBIN: 12.4 G/DL (ref 11.9–15.1)
POTASSIUM SERPL-SCNC: 4.4 MMOL/L (ref 3.7–5.3)
SODIUM BLD-SCNC: 140 MMOL/L (ref 135–144)

## 2022-10-20 PROCEDURE — 82565 ASSAY OF CREATININE: CPT

## 2022-10-20 PROCEDURE — 85014 HEMATOCRIT: CPT

## 2022-10-20 PROCEDURE — 87086 URINE CULTURE/COLONY COUNT: CPT

## 2022-10-20 PROCEDURE — 80051 ELECTROLYTE PANEL: CPT

## 2022-10-20 PROCEDURE — 82947 ASSAY GLUCOSE BLOOD QUANT: CPT

## 2022-10-20 PROCEDURE — 93005 ELECTROCARDIOGRAM TRACING: CPT | Performed by: STUDENT IN AN ORGANIZED HEALTH CARE EDUCATION/TRAINING PROGRAM

## 2022-10-20 PROCEDURE — 85018 HEMOGLOBIN: CPT

## 2022-10-20 PROCEDURE — 36415 COLL VENOUS BLD VENIPUNCTURE: CPT

## 2022-10-20 PROCEDURE — 84520 ASSAY OF UREA NITROGEN: CPT

## 2022-10-20 RX ORDER — FENOFIBRATE 145 MG/1
145 TABLET, COATED ORAL DAILY
COMMUNITY

## 2022-10-20 RX ORDER — UBIDECARENONE 75 MG
50 CAPSULE ORAL DAILY
COMMUNITY

## 2022-10-20 RX ORDER — BIOTIN 1 MG
TABLET ORAL
COMMUNITY

## 2022-10-20 RX ORDER — LUTEIN 10 MG
TABLET ORAL
COMMUNITY

## 2022-10-20 RX ORDER — MULTIVITAMIN WITH IRON
100 TABLET ORAL DAILY
COMMUNITY

## 2022-10-20 RX ORDER — ASCORBIC ACID 500 MG
500 TABLET ORAL DAILY
COMMUNITY

## 2022-10-20 NOTE — H&P (VIEW-ONLY)
History and Physical    Pt Name: John Hayward  MRN: [id number]  YOB: 1940  Date of evaluation: 10/20/2022  Primary Care Physician: Truman Gavin MD    SUBJECTIVE:   History of Chief Complaint:    John Hayward is a 79 y.o. female who presents for PAT appointment. Patient complains of pain to right groin with cramping sensation for the last two weeks. Patient also reports urgency and urinary incontinence for the last nine months. She denies any history of frequency, hematuria, dysuria or flank pain. She has a history of left ureteral stent placement 1/2022. Patient has been scheduled for ESWL EXTRACORPOREAL SHOCK WAVE LITHOTRIPSY - Right  Allergies  is allergic to seasonal.  Medications  Prior to Admission medications    Medication Sig Start Date End Date Taking? Authorizing Provider   fenofibrate (TRICOR) 145 MG tablet Take 145 mg by mouth daily   Yes Historical Provider, MD   Lutein 10 MG TABS Take by mouth   Yes Historical Provider, MD   Biotin 1000 MCG TABS Take by mouth   Yes Historical Provider, MD   vitamin B-12 (CYANOCOBALAMIN) 100 MCG tablet Take 50 mcg by mouth daily   Yes Historical Provider, MD   vitamin B-6 (PYRIDOXINE) 100 MG tablet Take 100 mg by mouth daily   Yes Historical Provider, MD   vitamin C (ASCORBIC ACID) 500 MG tablet Take 500 mg by mouth daily   Yes Historical Provider, MD   Sertraline HCl (ZOLOFT PO) Take by mouth daily    Historical Provider, MD   atorvastatin (LIPITOR) 20 MG tablet Take 20 mg by mouth nightly    Historical Provider, MD   amLODIPine (NORVASC) 2.5 MG tablet Take 2.5 mg by mouth daily. Historical Provider, MD   levothyroxine (SYNTHROID) 25 MCG tablet Take 25 mcg by mouth Daily.     Historical Provider, MD   Cholecalciferol (VITAMIN D3) 44490 UNITS CAPS Take by mouth once a week SUNDAYS    Historical Provider, MD   Coenzyme Q10 (CO Q 10 PO) Take by mouth nightly    Historical Provider, MD     Past Medical History    has a past medical history of Dental crowns present, Hyperlipidemia, Hypertension, Osteoarthritis, Renal calculus, Thyroid disease, Under care of team, Under care of team, and Under care of team.  Past Surgical History   has a past surgical history that includes Breast surgery (); Cataract removal with implant (Bilateral, 10/2019); Cosmetic surgery (2021); Colonoscopy; IR GUIDED URETERAL STENT PLACE W NEPHRO CATH NEW ACCESS (2022); Cystoscopy (Left, 2022); Kidney stone removal (Left, 2022); and Breast biopsy (Right). Social History   reports that she has never smoked. She has never used smokeless tobacco.    has no history on file for alcohol use. reports no history of drug use. Marital Status   Children 1 (passed)  Occupation retired   Family History  Family Status   Relation Name Status    Mother      Father       family history includes Emphysema in her mother; No Known Problems in her father. Review of Systems:  CONSTITUTIONAL:   negative for fevers, chills, fatigue and malaise    EYES:   negative for double vision, blurred vision and photophobia    HEENT:   negative for tinnitus, epistaxis and sore throat     RESPIRATORY:   negative for cough, shortness of breath, wheezing     CARDIOVASCULAR:   negative for chest pain, palpitations, syncope, edema     GASTROINTESTINAL:   negative for nausea, vomiting     GENITOURINARY:   Right groin pain, urgency, incontinence   MUSCULOSKELETAL:   negative for neck or back pain     NEUROLOGICAL:   Negative for weakness and tingling  negative for headaches and dizziness     PSYCHIATRIC:   negative for anxiety       OBJECTIVE:   VITALS:  height is 5' 1.5\" (1.562 m) and weight is 145 lb (65.8 kg). Her temporal temperature is 98.5 °F (36.9 °C). Her blood pressure is 158/65 (abnormal) and her pulse is 73. Her respiration is 16 and oxygen saturation is 95%. CONSTITUTIONAL:alert & oriented x 3, no acute distress. Calm and pleasant.   SKIN:  Warm and dry, no rashes to exposed areas of skin. HEAD:  Normocephalic, atraumatic. EYES: PERRL. EOMs intact. EARS:  Intact and equal bilaterally. Hearing loss bilaterally; no aides. NOSE:  Nares patent. No rhinorrhea   MOUTH/THROAT:  Mucous membranes pink and moist, teeth appear to be intact  NECK:supple, good ROM. LUNGS: Respirations even and non-labored. Clear to auscultation bilaterally, no wheezes, rales, or rhonchi. CARDIOVASCULAR: Regular rate and rhythm, possible soft murmur. ABDOMEN: soft, non-tender, non-distended, bowel sounds active x 4   EXTREMITIES: No edema to bilateral lower extremities. No varicosities to bilateral lower extremities. NEUROLOGIC: CN II-XII are grossly intact. Gait is smooth. Testing:   EKG: 10/20/2022  IMPRESSIONS:   Kidney stone.    PLANS:   ESWL EXTRACORPOREAL SHOCK WAVE LITHOTRIPSY - Right    NANCIE Long CNP  Electronically signed 10/20/2022 at 3:44 PM

## 2022-10-20 NOTE — H&P
History and Physical    Pt Name: Yasmeen Tripathi  MRN: [id number]  YOB: 1940  Date of evaluation: 10/20/2022  Primary Care Physician: Yane Stephenson MD    SUBJECTIVE:   History of Chief Complaint:    Yasmeen Tripathi is a 79 y.o. female who presents for PAT appointment. Patient complains of pain to right groin with cramping sensation for the last two weeks. Patient also reports urgency and urinary incontinence for the last nine months. She denies any history of frequency, hematuria, dysuria or flank pain. She has a history of left ureteral stent placement 1/2022. Patient has been scheduled for ESWL EXTRACORPOREAL SHOCK WAVE LITHOTRIPSY - Right  Allergies  is allergic to seasonal.  Medications  Prior to Admission medications    Medication Sig Start Date End Date Taking? Authorizing Provider   fenofibrate (TRICOR) 145 MG tablet Take 145 mg by mouth daily   Yes Historical Provider, MD   Lutein 10 MG TABS Take by mouth   Yes Historical Provider, MD   Biotin 1000 MCG TABS Take by mouth   Yes Historical Provider, MD   vitamin B-12 (CYANOCOBALAMIN) 100 MCG tablet Take 50 mcg by mouth daily   Yes Historical Provider, MD   vitamin B-6 (PYRIDOXINE) 100 MG tablet Take 100 mg by mouth daily   Yes Historical Provider, MD   vitamin C (ASCORBIC ACID) 500 MG tablet Take 500 mg by mouth daily   Yes Historical Provider, MD   Sertraline HCl (ZOLOFT PO) Take by mouth daily    Historical Provider, MD   atorvastatin (LIPITOR) 20 MG tablet Take 20 mg by mouth nightly    Historical Provider, MD   amLODIPine (NORVASC) 2.5 MG tablet Take 2.5 mg by mouth daily. Historical Provider, MD   levothyroxine (SYNTHROID) 25 MCG tablet Take 25 mcg by mouth Daily.     Historical Provider, MD   Cholecalciferol (VITAMIN D3) 74126 UNITS CAPS Take by mouth once a week SUNDAYS    Historical Provider, MD   Coenzyme Q10 (CO Q 10 PO) Take by mouth nightly    Historical Provider, MD     Past Medical History    has a past medical history of Dental crowns present, Hyperlipidemia, Hypertension, Osteoarthritis, Renal calculus, Thyroid disease, Under care of team, Under care of team, and Under care of team.  Past Surgical History   has a past surgical history that includes Breast surgery (); Cataract removal with implant (Bilateral, 10/2019); Cosmetic surgery (2021); Colonoscopy; IR GUIDED URETERAL STENT PLACE W NEPHRO CATH NEW ACCESS (2022); Cystoscopy (Left, 2022); Kidney stone removal (Left, 2022); and Breast biopsy (Right). Social History   reports that she has never smoked. She has never used smokeless tobacco.    has no history on file for alcohol use. reports no history of drug use. Marital Status   Children 1 (passed)  Occupation retired   Family History  Family Status   Relation Name Status    Mother      Father       family history includes Emphysema in her mother; No Known Problems in her father. Review of Systems:  CONSTITUTIONAL:   negative for fevers, chills, fatigue and malaise    EYES:   negative for double vision, blurred vision and photophobia    HEENT:   negative for tinnitus, epistaxis and sore throat     RESPIRATORY:   negative for cough, shortness of breath, wheezing     CARDIOVASCULAR:   negative for chest pain, palpitations, syncope, edema     GASTROINTESTINAL:   negative for nausea, vomiting     GENITOURINARY:   Right groin pain, urgency, incontinence   MUSCULOSKELETAL:   negative for neck or back pain     NEUROLOGICAL:   Negative for weakness and tingling  negative for headaches and dizziness     PSYCHIATRIC:   negative for anxiety       OBJECTIVE:   VITALS:  height is 5' 1.5\" (1.562 m) and weight is 145 lb (65.8 kg). Her temporal temperature is 98.5 °F (36.9 °C). Her blood pressure is 158/65 (abnormal) and her pulse is 73. Her respiration is 16 and oxygen saturation is 95%. CONSTITUTIONAL:alert & oriented x 3, no acute distress. Calm and pleasant.   SKIN:  Warm and dry, no rashes to exposed areas of skin. HEAD:  Normocephalic, atraumatic. EYES: PERRL. EOMs intact. EARS:  Intact and equal bilaterally. Hearing loss bilaterally; no aides. NOSE:  Nares patent. No rhinorrhea   MOUTH/THROAT:  Mucous membranes pink and moist, teeth appear to be intact  NECK:supple, good ROM. LUNGS: Respirations even and non-labored. Clear to auscultation bilaterally, no wheezes, rales, or rhonchi. CARDIOVASCULAR: Regular rate and rhythm, possible soft murmur. ABDOMEN: soft, non-tender, non-distended, bowel sounds active x 4   EXTREMITIES: No edema to bilateral lower extremities. No varicosities to bilateral lower extremities. NEUROLOGIC: CN II-XII are grossly intact. Gait is smooth. Testing:   EKG: 10/20/2022  IMPRESSIONS:   Kidney stone.    PLANS:   ESWL EXTRACORPOREAL SHOCK WAVE LITHOTRIPSY - Right    NANCIE Taveras CNP  Electronically signed 10/20/2022 at 3:44 PM

## 2022-10-20 NOTE — PROGRESS NOTES
Anesthesia Focused Assessment    Hx of anesthesia complications:  no  Family hx of anesthesia complications:  no      Prior + Covid-19 test? yes  Date: 9/9819  Complications: none  Hospitalization required? no      STOP-BANG Sleep Apnea Questionnaire    SNORE loudly (heard through closed doors)? No  TIRED, fatigued, sleepy during daytime? No  OBSERVED stopping breathing during sleep? No  High blood PRESSURE or being treated? No    BMI over 35? No  AGE over 48? Yes  NECK circumference over 16\"? No  GENDER (male)? No             Total 1  High risk 5-8  Intermediate risk 3-4  Low risk 0-2    ----------------------------------------------------------------------------------------------------------------------  ASHOK                              No  If yes, machine? DM1                                            No  DM2                   No    Coronary Artery Disease      No  HTN         No  Defib/AICD/Pacemaker               No             Renal Failure                   No  If yes, on dialysis           Active smoker? No  Drinks alcohol? No  Illicit drugs? No  Dentition? Permanent crowns      Past Medical History:   Diagnosis Date    Dental crowns present 12/29/2021    permanent    Hyperlipidemia     Hypertension     Osteoarthritis     Renal calculus     Thyroid disease 12/29/2021    hypothyroid    Under care of team     PCP - DR. AZUCENA PEREZ; last visit May 2022    Under care of team 12/29/2021    UROLOGY - DR. GHOSH - LAST VISIT 12/2021    Under care of team 12/29/2021    PLASTICS - DR. Enriquez [place]         Patient was evaluated in PAT & anesthesia guidelines were applied. NPO guidelines, medication instructions and scheduled arrival time were reviewed with patient.                                                                                                                        Anesthesia contacted:   no    Medical or cardiac clearance ordered: NANCIE Meyer - CNP   10/20/22  3:46 PM

## 2022-10-21 LAB
CULTURE: NORMAL
EKG ATRIAL RATE: 66 BPM
EKG P AXIS: 45 DEGREES
EKG P-R INTERVAL: 164 MS
EKG Q-T INTERVAL: 426 MS
EKG QRS DURATION: 88 MS
EKG QTC CALCULATION (BAZETT): 446 MS
EKG R AXIS: -11 DEGREES
EKG T AXIS: 38 DEGREES
EKG VENTRICULAR RATE: 66 BPM
SPECIMEN DESCRIPTION: NORMAL

## 2022-10-21 PROCEDURE — 93010 ELECTROCARDIOGRAM REPORT: CPT | Performed by: INTERNAL MEDICINE

## 2022-11-03 ENCOUNTER — ANESTHESIA EVENT (OUTPATIENT)
Dept: OPERATING ROOM | Age: 82
End: 2022-11-03
Payer: MEDICARE

## 2022-11-03 ENCOUNTER — ANESTHESIA (OUTPATIENT)
Dept: OPERATING ROOM | Age: 82
End: 2022-11-03
Payer: MEDICARE

## 2022-11-03 ENCOUNTER — HOSPITAL ENCOUNTER (OUTPATIENT)
Age: 82
Setting detail: OUTPATIENT SURGERY
Discharge: HOME | End: 2022-11-03
Attending: UROLOGY | Admitting: UROLOGY
Payer: MEDICARE

## 2022-11-03 ENCOUNTER — APPOINTMENT (OUTPATIENT)
Dept: GENERAL RADIOLOGY | Age: 82
End: 2022-11-03
Attending: UROLOGY
Payer: MEDICARE

## 2022-11-03 VITALS
SYSTOLIC BLOOD PRESSURE: 156 MMHG | HEART RATE: 77 BPM | DIASTOLIC BLOOD PRESSURE: 87 MMHG | TEMPERATURE: 97.9 F | OXYGEN SATURATION: 99 % | RESPIRATION RATE: 14 BRPM

## 2022-11-03 DIAGNOSIS — N20.0 LEFT RENAL STONE: Primary | ICD-10-CM

## 2022-11-03 PROCEDURE — 2709999900 HC NON-CHARGEABLE SUPPLY: Performed by: UROLOGY

## 2022-11-03 PROCEDURE — 2580000003 HC RX 258: Performed by: STUDENT IN AN ORGANIZED HEALTH CARE EDUCATION/TRAINING PROGRAM

## 2022-11-03 PROCEDURE — 2500000003 HC RX 250 WO HCPCS: Performed by: NURSE ANESTHETIST, CERTIFIED REGISTERED

## 2022-11-03 PROCEDURE — 3600000012 HC SURGERY LEVEL 2 ADDTL 15MIN: Performed by: UROLOGY

## 2022-11-03 PROCEDURE — 3600000002 HC SURGERY LEVEL 2 BASE: Performed by: UROLOGY

## 2022-11-03 PROCEDURE — 74018 RADEX ABDOMEN 1 VIEW: CPT

## 2022-11-03 PROCEDURE — 3700000000 HC ANESTHESIA ATTENDED CARE: Performed by: UROLOGY

## 2022-11-03 PROCEDURE — 6360000002 HC RX W HCPCS: Performed by: NURSE ANESTHETIST, CERTIFIED REGISTERED

## 2022-11-03 PROCEDURE — 3700000001 HC ADD 15 MINUTES (ANESTHESIA): Performed by: UROLOGY

## 2022-11-03 PROCEDURE — 7100000040 HC SPAR PHASE II RECOVERY - FIRST 15 MIN: Performed by: UROLOGY

## 2022-11-03 PROCEDURE — 7100000001 HC PACU RECOVERY - ADDTL 15 MIN: Performed by: UROLOGY

## 2022-11-03 PROCEDURE — 7100000041 HC SPAR PHASE II RECOVERY - ADDTL 15 MIN: Performed by: UROLOGY

## 2022-11-03 PROCEDURE — 6360000002 HC RX W HCPCS: Performed by: STUDENT IN AN ORGANIZED HEALTH CARE EDUCATION/TRAINING PROGRAM

## 2022-11-03 PROCEDURE — 7100000000 HC PACU RECOVERY - FIRST 15 MIN: Performed by: UROLOGY

## 2022-11-03 RX ORDER — SODIUM CHLORIDE 9 MG/ML
INJECTION, SOLUTION INTRAVENOUS PRN
Status: DISCONTINUED | OUTPATIENT
Start: 2022-11-03 | End: 2022-11-03 | Stop reason: HOSPADM

## 2022-11-03 RX ORDER — OXYCODONE HYDROCHLORIDE 5 MG/1
5 TABLET ORAL EVERY 6 HOURS PRN
Qty: 12 TABLET | Refills: 0 | Status: SHIPPED | OUTPATIENT
Start: 2022-11-03 | End: 2022-11-06

## 2022-11-03 RX ORDER — SODIUM CHLORIDE 0.9 % (FLUSH) 0.9 %
5-40 SYRINGE (ML) INJECTION EVERY 12 HOURS SCHEDULED
Status: DISCONTINUED | OUTPATIENT
Start: 2022-11-03 | End: 2022-11-03 | Stop reason: HOSPADM

## 2022-11-03 RX ORDER — ONDANSETRON 2 MG/ML
INJECTION INTRAMUSCULAR; INTRAVENOUS PRN
Status: DISCONTINUED | OUTPATIENT
Start: 2022-11-03 | End: 2022-11-03 | Stop reason: SDUPTHER

## 2022-11-03 RX ORDER — EPHEDRINE SULFATE/0.9% NACL/PF 50 MG/5 ML
SYRINGE (ML) INTRAVENOUS PRN
Status: DISCONTINUED | OUTPATIENT
Start: 2022-11-03 | End: 2022-11-03 | Stop reason: SDUPTHER

## 2022-11-03 RX ORDER — PROPOFOL 10 MG/ML
INJECTION, EMULSION INTRAVENOUS PRN
Status: DISCONTINUED | OUTPATIENT
Start: 2022-11-03 | End: 2022-11-03 | Stop reason: SDUPTHER

## 2022-11-03 RX ORDER — SODIUM CHLORIDE, SODIUM LACTATE, POTASSIUM CHLORIDE, CALCIUM CHLORIDE 600; 310; 30; 20 MG/100ML; MG/100ML; MG/100ML; MG/100ML
1000 INJECTION, SOLUTION INTRAVENOUS CONTINUOUS
Status: DISCONTINUED | OUTPATIENT
Start: 2022-11-03 | End: 2022-11-03 | Stop reason: HOSPADM

## 2022-11-03 RX ORDER — FENTANYL CITRATE 50 UG/ML
INJECTION, SOLUTION INTRAMUSCULAR; INTRAVENOUS PRN
Status: DISCONTINUED | OUTPATIENT
Start: 2022-11-03 | End: 2022-11-03 | Stop reason: SDUPTHER

## 2022-11-03 RX ORDER — TAMSULOSIN HYDROCHLORIDE 0.4 MG/1
0.4 CAPSULE ORAL DAILY
Qty: 7 CAPSULE | Refills: 0 | Status: SHIPPED | OUTPATIENT
Start: 2022-11-03 | End: 2022-11-10

## 2022-11-03 RX ORDER — CEPHALEXIN 500 MG/1
500 CAPSULE ORAL 3 TIMES DAILY
Qty: 9 CAPSULE | Refills: 0 | Status: SHIPPED | OUTPATIENT
Start: 2022-11-03 | End: 2022-11-06

## 2022-11-03 RX ORDER — SODIUM CHLORIDE 0.9 % (FLUSH) 0.9 %
5-40 SYRINGE (ML) INJECTION PRN
Status: DISCONTINUED | OUTPATIENT
Start: 2022-11-03 | End: 2022-11-03 | Stop reason: HOSPADM

## 2022-11-03 RX ORDER — ONDANSETRON 2 MG/ML
4 INJECTION INTRAMUSCULAR; INTRAVENOUS
Status: DISCONTINUED | OUTPATIENT
Start: 2022-11-03 | End: 2022-11-03 | Stop reason: HOSPADM

## 2022-11-03 RX ORDER — FENTANYL CITRATE 50 UG/ML
25 INJECTION, SOLUTION INTRAMUSCULAR; INTRAVENOUS EVERY 5 MIN PRN
Status: DISCONTINUED | OUTPATIENT
Start: 2022-11-03 | End: 2022-11-03 | Stop reason: HOSPADM

## 2022-11-03 RX ORDER — FENTANYL CITRATE 50 UG/ML
50 INJECTION, SOLUTION INTRAMUSCULAR; INTRAVENOUS EVERY 5 MIN PRN
Status: DISCONTINUED | OUTPATIENT
Start: 2022-11-03 | End: 2022-11-03 | Stop reason: HOSPADM

## 2022-11-03 RX ORDER — DEXAMETHASONE SODIUM PHOSPHATE 10 MG/ML
INJECTION INTRAMUSCULAR; INTRAVENOUS PRN
Status: DISCONTINUED | OUTPATIENT
Start: 2022-11-03 | End: 2022-11-03 | Stop reason: SDUPTHER

## 2022-11-03 RX ADMIN — DEXAMETHASONE SODIUM PHOSPHATE 5 MG: 10 INJECTION INTRAMUSCULAR; INTRAVENOUS at 09:03

## 2022-11-03 RX ADMIN — Medication 2000 MG: at 09:08

## 2022-11-03 RX ADMIN — Medication 10 MG: at 09:22

## 2022-11-03 RX ADMIN — Medication 10 MG: at 09:32

## 2022-11-03 RX ADMIN — Medication 10 MG: at 09:16

## 2022-11-03 RX ADMIN — FENTANYL CITRATE 25 MCG: 50 INJECTION, SOLUTION INTRAMUSCULAR; INTRAVENOUS at 09:50

## 2022-11-03 RX ADMIN — PROPOFOL 50 MG: 10 INJECTION, EMULSION INTRAVENOUS at 09:01

## 2022-11-03 RX ADMIN — Medication 5 MG: at 09:13

## 2022-11-03 RX ADMIN — ONDANSETRON 4 MG: 2 INJECTION INTRAMUSCULAR; INTRAVENOUS at 09:45

## 2022-11-03 RX ADMIN — SODIUM CHLORIDE, POTASSIUM CHLORIDE, SODIUM LACTATE AND CALCIUM CHLORIDE 1000 ML: 600; 310; 30; 20 INJECTION, SOLUTION INTRAVENOUS at 08:45

## 2022-11-03 RX ADMIN — PROPOFOL 150 MG: 10 INJECTION, EMULSION INTRAVENOUS at 08:59

## 2022-11-03 ASSESSMENT — PAIN SCALES - GENERAL: PAINLEVEL_OUTOF10: 0

## 2022-11-03 NOTE — DISCHARGE INSTRUCTIONS
Discharge instructions: ESWL  You may experience pain and/or burning with urination and see blood in the urine after your procedure. This should resolve over the next few days. Ok to discharge home in good condition  No heavy lifting, >10 lbs for today  Patient should avoid strenuous activity for today  Patient should walk moderately at home   [address] to shower   Patient may resume diet as tolerated  Please call attending physician or hospital  with questions  Call or go to ED if fever (> 101F), intractable nausea vomiting or pain, inability to urinate  Please take prescriptions as directed if prescribed      Patient should follow up with Dr. Adan Shaffer, in 1-2 weeks, call to confirm appointment  No alcoholic beverages, no driving or operating machinery, no making important decisions for 24 hours. Children should maintain quiet play ( games, movies, books ) for 24 hours. You may have a normal diet but should eat lightly day of surgery. Drink plenty of fluids.   Urinate within 8 hours after surgery, if unable to urinate call your doctor

## 2022-11-03 NOTE — INTERVAL H&P NOTE
Update History & Physical    The patient's History and Physical of October 20, 2022 was reviewed with the patient and I examined the patient. There was no change. The surgical site was confirmed by the patient and me. Plan: The risks, benefits, expected outcome, and alternative to the recommended procedure have been discussed with the patient. Patient understands and wants to proceed with the procedure.      Electronically signed by Danish Whiteside MD on 11/3/2022 at 7:12 AM

## 2022-11-03 NOTE — ANESTHESIA POSTPROCEDURE EVALUATION
Department of Anesthesiology  Postprocedure Note    Patient: Raymundo Wilhelm  MRN: [id number]  YOB: 1940  Date of evaluation: 11/3/2022      Procedure Summary     Date: 11/03/22 Room / Location: [address]    Anesthesia Start: 6636 Anesthesia Stop: 3068    Procedure: ESWL EXTRACORPOREAL SHOCK WAVE LITHOTRIPSY (Right) Diagnosis:       Right kidney stone      (RIGHT KIDNEY STONE)    Surgeons: Javon Ott MD Responsible Provider: Alfred Pompa MD    Anesthesia Type: general ASA Status: 2          Anesthesia Type: No value filed.     Hero Phase I: Hero Score: 10    Hero Phase II: Hero Score: 10    POST-OP ANESTHESIA NOTE       BP (!) 156/87   Pulse 77   Temp 97.9 °F (36.6 °C)   Resp 14   SpO2 99%    Pain Assessment: None - Denies Pain  Pain Level: 0         Anesthesia Post Evaluation    Patient location during evaluation: PACU  Patient participation: complete - patient participated  Level of consciousness: awake  Pain score: 0  Airway patency: patent  Nausea & Vomiting: no vomiting and no nausea  Complications: no  Cardiovascular status: hemodynamically stable  Respiratory status: acceptable  Hydration status: stable

## 2022-11-03 NOTE — OP NOTE
Operative Note      Patient: Gerardo Reddy  YOB: 1940  MRN: [id number]    Date of Procedure: 11/3/2022    Pre-Op Diagnosis: RIGHT KIDNEY STONE    Post-Op Diagnosis: Same       Procedure(s):  ESWL EXTRACORPOREAL SHOCK WAVE LITHOTRIPSY, RIGHT    Surgeon(s):  Eli Linares MD    Assistant:   Resident: Crystal Wilson MD    Anesthesia: General    Estimated Blood Loss (mL): Minimal    Complications: None    Specimens:   * No specimens in log *    Implants:  * No implants in log *      Drains: * No LDAs found *    Findings:   KUB: right renal pelvis stone 8 mm    INDICATIONS FOR THE PROCEDURES:  Patient is a 79 y.o. female with right kidney stone. She presents today for extracorporeal shock wave lithotripsy. The risks and benefits of the procedure as well as possible alternatives and complications were discussed and she consented. DETAILS OF THE PROCEDURE:  Patient was correctly identified in the preoperative holding area. she was brought back to the operating room and placed under general endotracheal anesthesia. Prophylactic antibiotics were given in the form of Ancef 2gm IV. She was then placed in the supine position. The lithotriptor was positioned using fluoroscopic guidance. Shocks were administered as follows:    3 minute pause: Yes  Total Shocks: 3000  Frequency: 90/min  Voltage:12- 19 kV    There was good fragmentation of the stone on fluoroscopy. The patient was awoken and sent to PACU for post-operative monitoring    DISPOSITION:  Patient was discharged home in stable condition with appropriate follow-up in clinic in 3-4 weeks with KUB.         Electronically signed by Crystal Wilson MD on 11/3/2022 at 9:10 AM

## 2022-11-03 NOTE — ANESTHESIA PRE PROCEDURE
Department of Anesthesiology  Preprocedure Note       Name:  Skylar Sepulveda   Age:  79 y.o.  :  1940                                          MRN:  [id number]         Date:  11/3/2022      Surgeon: Lonnie Mckeon):  Francisco Rowland MD    Procedure:   IR URETERAL PLACEMENT STENT&NEPHRO CATH NEW ACCESS             Name:  Skylar Sepulveda                                         Age:  79 y.o. MRN:  [id number]             Medications  Current Facility-Administered Medications   Medication Dose Route Frequency Provider Last Rate Last Admin    ceFAZolin (ANCEF) 2000 mg in sterile water 20 mL IV syringe  2,000 mg IntraVENous Once Gemma Zeng MD        lactated ringers infusion 1,000 mL  1,000 mL IntraVENous Continuous Germain Ramos MD           Allergies   Allergen Reactions    Seasonal Other (See Comments)     sneezing     Patient Active Problem List   Diagnosis    Hypertrophy of breast    Cervicalgia    Backache    Other plastic surgery for unacceptable cosmetic appearance    Elective procedure for unacceptable cosmetic appearance    Left renal stone     Past Medical History:   Diagnosis Date    Dental crowns present 2021    permanent    Hyperlipidemia     Hypertension     Osteoarthritis     Renal calculus     Thyroid disease 2021    hypothyroid    Under care of team     PCP - DR. AZUCENA PEREZ; last visit May 2022    Under care of team 2021    UROLOGY - DR. GHOSH - LAST VISIT 2021    Under care of team 2021    [facility]     Past Surgical History:   Procedure Laterality Date    BREAST BIOPSY Right     BREAST SURGERY      cresencio breast reduction- Dr. Park Danielle Bilateral 10/2019    COLONOSCOPY      COSMETIC SURGERY  2021    Laser resurfacing and deep oral and 50/50 ocular- Dr. Eran Catalan Left 2022    CYSTOSCOPY, INSERTION OCCLUSIVE BALLOON performed by Francisco Rowland MD at [facility] STENT&NEPHRO CATH NEW ACCESS  01/05/2022    IR URETERAL PLACEMENT STENT&NEPHRO CATH NEW ACCESS 1/5/2022 Adarsh Patel MD [facility] SPECIAL PROCEDURES    KIDNEY STONE REMOVAL Left 01/05/2022    HOLMIUM -STAND-BY, PERCUTANEOUS NEPHROLITHOTOMY, C-ARM, LITHOCLAST  (PT. COMING FROM INTERVENTIONAL) performed by Leyla Gold MD at [facility] Use    Smoking status: Never    Smokeless tobacco: Never   Substance Use Topics    Drug use: No         Vital Signs (Current)   There were no vitals filed for this visit. Vital Signs Statistics (for past 48 hrs)     No data recorded  BP Readings from Last 3 Encounters:   10/20/22 (!) 158/65   01/07/22 (!) 117/49   01/05/22 (!) 117/57       BMI  There is no height or weight on file to calculate BMI. CBC   Lab Results   Component Value Date/Time    WBC 8.2 01/07/2022 05:32 AM    RBC 3.51 01/07/2022 05:32 AM    HGB 12.4 10/20/2022 02:49 PM    HCT 39.0 10/20/2022 02:49 PM    MCV 92.0 01/07/2022 05:32 AM    RDW 13.7 01/07/2022 05:32 AM     01/07/2022 05:32 AM       CMP    Lab Results   Component Value Date/Time     10/20/2022 02:49 PM    K 4.4 10/20/2022 02:49 PM     10/20/2022 02:49 PM    CO2 25 10/20/2022 02:49 PM    BUN 25 10/20/2022 02:49 PM    CREATININE 0.77 10/20/2022 02:49 PM    GFRAA 56 01/07/2022 05:32 AM    LABGLOM >60 10/20/2022 02:49 PM    GLUCOSE 116 10/20/2022 02:49 PM    CALCIUM 9.0 01/07/2022 05:32 AM       BMP    Lab Results   Component Value Date/Time     10/20/2022 02:49 PM    K 4.4 10/20/2022 02:49 PM     10/20/2022 02:49 PM    CO2 25 10/20/2022 02:49 PM    BUN 25 10/20/2022 02:49 PM    CREATININE 0.77 10/20/2022 02:49 PM    CALCIUM 9.0 01/07/2022 05:32 AM    GFRAA 56 01/07/2022 05:32 AM    LABGLOM >60 10/20/2022 02:49 PM    GLUCOSE 116 10/20/2022 02:49 PM       POC Testing  No results for input(s): POCGLU, POCNA, POCK, POCCL, POCBUN, POCHEMO, POCHCT in the last 72 hours.     Coags    Lab Results Component Value Date/Time    PROTIME 10.0 12/29/2021 04:34 PM    INR 0.9 12/29/2021 04:34 PM    APTT 25.5 12/29/2021 04:34 PM       HCG (If Applicable) No results found for: PREGTESTUR, PREGSERUM, HCG, HCGQUANT     ABGs No results found for: PHART, PO2ART, QYQ7RWL, YCY4JQF, BEART, N7ZSEOUH     Type & Screen (If Applicable)  No results found for: LABABO, [address]    Radiology (If Applicable)    Cardiac Testing (If Applicable)     EKG (If Applicable) ? LVH          Medications prior to admission:   Prior to Admission medications    Medication Sig Start Date End Date Taking? Authorizing Provider   fenofibrate (TRICOR) 145 MG tablet Take 145 mg by mouth daily    Historical Provider, MD   Lutein 10 MG TABS Take by mouth    Historical Provider, MD   Biotin 1000 MCG TABS Take by mouth    Historical Provider, MD   vitamin B-12 (CYANOCOBALAMIN) 100 MCG tablet Take 50 mcg by mouth daily    Historical Provider, MD   vitamin B-6 (PYRIDOXINE) 100 MG tablet Take 100 mg by mouth daily    Historical Provider, MD   vitamin C (ASCORBIC ACID) 500 MG tablet Take 500 mg by mouth daily    Historical Provider, MD   Sertraline HCl (ZOLOFT PO) Take by mouth daily    Historical Provider, MD   atorvastatin (LIPITOR) 20 MG tablet Take 20 mg by mouth nightly    Historical Provider, MD   amLODIPine (NORVASC) 2.5 MG tablet Take 2.5 mg by mouth daily. Historical Provider, MD   levothyroxine (SYNTHROID) 25 MCG tablet Take 25 mcg by mouth Daily.     Historical Provider, MD   Cholecalciferol (VITAMIN D3) 75700 UNITS CAPS Take by mouth once a week SUNDAYS    Historical Provider, MD   Coenzyme Q10 (CO Q 10 PO) Take by mouth nightly    Historical Provider, MD       Current medications:    Current Facility-Administered Medications   Medication Dose Route Frequency Provider Last Rate Last Admin    ceFAZolin (ANCEF) 2000 mg in sterile water 20 mL IV syringe  2,000 mg IntraVENous Once Tania Terrell MD        lactated ringers infusion 1,000 mL  1,000 mL IntraVENous Continuous Diane Pruitt MD           Allergies: Allergies   Allergen Reactions    Seasonal Other (See Comments)     sneezing       Problem List:    Patient Active Problem List   Diagnosis Code    Hypertrophy of breast N62    Cervicalgia M54.2    Backache M54.9    Other plastic surgery for unacceptable cosmetic appearance Z41.1    Elective procedure for unacceptable cosmetic appearance Z41.1    Left renal stone N20.0       Past Medical History:        Diagnosis Date    Dental crowns present 12/29/2021    permanent    Hyperlipidemia     Hypertension     Osteoarthritis     Renal calculus     Thyroid disease 12/29/2021    hypothyroid    Under care of team     PCP - DR. AZUCENA PEREZ; last visit May 2022    Under care of team 12/29/2021    UROLOGY - DR. GHOSH - LAST VISIT 12/2021    Under care of team 12/29/2021    [facility]       Past Surgical History:        Procedure Laterality Date    BREAST BIOPSY Right     BREAST SURGERY  2005    cresencio breast reduction- Dr. Elizabeth Will Bilateral 10/2019    COLONOSCOPY      COSMETIC SURGERY  01/28/2021    Laser resurfacing and deep oral and 50/50 ocular- Dr. Arlyn Leon Left 01/05/2022    CYSTOSCOPY, INSERTION OCCLUSIVE BALLOON performed by Cande Mcmillan MD at [facility] STENT&NEPHRO [address]  01/05/2022    IR URETERAL PLACEMENT STENT&NEPHRO CATH NEW ACCESS 1/5/2022 Layla Garrido MD [facility] SPECIAL PROCEDURES    KIDNEY STONE REMOVAL Left 01/05/2022    HOLMIUM -STAND-BY, PERCUTANEOUS NEPHROLITHOTOMY, C-ARM, LITHOCLAST  (PT. COMING FROM INTERVENTIONAL) performed by Cande Mcmillan MD at [address] History:    Social History     Tobacco Use    Smoking status: Never    Smokeless tobacco: Never   Substance Use Topics    Alcohol use: Not on file     Comment: NO                                Counseling given: Not Answered      Vital Signs (Current):    There were no vitals filed for this visit. BP Readings from Last 3 Encounters:   10/20/22 (!) 158/65   01/07/22 (!) 117/49   01/05/22 (!) 117/57       NPO Status:                            MN                                                    BMI:   Wt Readings from Last 3 Encounters:   10/31/22 145 lb (65.8 kg)   10/20/22 145 lb (65.8 kg)   12/29/21 143 lb (64.9 kg)     There is no height or weight on file to calculate BMI.    CBC:   Lab Results   Component Value Date/Time    WBC 8.2 01/07/2022 05:32 AM    RBC 3.51 01/07/2022 05:32 AM    HGB 12.4 10/20/2022 02:49 PM    HCT 39.0 10/20/2022 02:49 PM    MCV 92.0 01/07/2022 05:32 AM    RDW 13.7 01/07/2022 05:32 AM     01/07/2022 05:32 AM       CMP:   Lab Results   Component Value Date/Time     10/20/2022 02:49 PM    K 4.4 10/20/2022 02:49 PM     10/20/2022 02:49 PM    CO2 25 10/20/2022 02:49 PM    BUN 25 10/20/2022 02:49 PM    CREATININE 0.77 10/20/2022 02:49 PM    GFRAA 56 01/07/2022 05:32 AM    LABGLOM >60 10/20/2022 02:49 PM    GLUCOSE 116 10/20/2022 02:49 PM    CALCIUM 9.0 01/07/2022 05:32 AM       POC Tests: No results for input(s): POCGLU, POCNA, POCK, POCCL, POCBUN, POCHEMO, POCHCT in the last 72 hours.     Coags:   Lab Results   Component Value Date/Time    PROTIME 10.0 12/29/2021 04:34 PM    INR 0.9 12/29/2021 04:34 PM    APTT 25.5 12/29/2021 04:34 PM       HCG (If Applicable): No results found for: PREGTESTUR, PREGSERUM, HCG, HCGQUANT     ABGs: No results found for: PHART, PO2ART, VLK7ONF, RIX8LIV, BEART, Z9CQRLHK     Type & Screen (If Applicable):  No results found for: LABABO, LABRH    Drug/Infectious Status (If Applicable):  No results found for: HIV, HEPCAB    COVID-19 Screening (If Applicable): No results found for: COVID19        Anesthesia Evaluation   no history of anesthetic complications:   Airway: Mallampati: II  TM distance: >3 FB   Neck ROM: full  Mouth opening: > = 3 FB   Dental:    (+) caps and bridge  Comment: Dentition intact    Pulmonary:Negative Pulmonary ROS breath sounds clear to auscultation      (-) recent URI                           Cardiovascular:  Exercise tolerance: good (>4 METS),   (+) hypertension:,         Rhythm: regular  Rate: normal                    Neuro/Psych:   (+) neuromuscular disease:,    (-) seizures           GI/Hepatic/Renal:   (+) renal disease: kidney stones,           Endo/Other:    (+) hypothyroidism: arthritis: OA., .    (-) diabetes mellitus               Abdominal:         (-) obese       Vascular: negative vascular ROS. Other Findings: Temp crowns            Anesthesia Plan      general     ASA 2       Induction: intravenous. MIPS: Postoperative opioids intended. Anesthetic plan and risks discussed with patient. Plan discussed with CRNA.                     Susan Gordon MD   11/3/2022

## 2023-08-07 ENCOUNTER — TELEPHONE (OUTPATIENT)
Dept: SURGERY | Age: 83
End: 2023-08-07

## (undated) DEVICE — PAD,ABDOMINAL,5"X9",ST,LF,25/BX: Brand: MEDLINE INDUSTRIES, INC.

## (undated) DEVICE — GLOVE ORANGE PI 8 1/2   MSG9085

## (undated) DEVICE — OCCLUSION BALLOON CATHETER: Brand: OCCLUDER

## (undated) DEVICE — DECANTER BAG 9": Brand: MEDLINE INDUSTRIES, INC.

## (undated) DEVICE — HIGH PRESSURE NEPHROSTOMY BALLOON CATHETER KIT: Brand: NEPHROMAX KIT

## (undated) DEVICE — KIT NEPHSTMY CATH DIA7FR LINGEMAN 0.035IN SUP STIFF J DIL

## (undated) DEVICE — SERVICE TREATMENT ESWL UNILAT LITHO

## (undated) DEVICE — SVMMC PEDS/UROLOGY MINOR PACK: Brand: MEDLINE INDUSTRIES, INC.

## (undated) DEVICE — LIEBERMAN INTRODUCER: Brand: LIEBERMAN

## (undated) DEVICE — CATHETER URETH 16FR BLLN 5CC SIL ALLY W/ SIL HYDRGEL 2 W F

## (undated) DEVICE — C-ARM: Brand: UNBRANDED

## (undated) DEVICE — CATHETER URETH PED 22FR BLLN 3CC 2 W F INF CTRL BARDX

## (undated) DEVICE — GLOVE,EXAM,NITRILE,RESTORE,OAT SENSE,L: Brand: MEDLINE

## (undated) DEVICE — GUIDEWIRE URO L150CM DIA0.035IN STIFF NIT HYDRPHLC STR TIP

## (undated) DEVICE — TUBING, SUCTION, 9/32" X 20', STRAIGHT: Brand: MEDLINE INDUSTRIES, INC.

## (undated) DEVICE — Z DISCONTINUED NO SUB IDED TAPE UMB W1/8XL36IN WHT COT STRND NONRADIOPAQUE

## (undated) DEVICE — SYRINGE MED 50ML LUERLOCK TIP

## (undated) DEVICE — GLOVE ORANGE PI 7 1/2   MSG9075

## (undated) DEVICE — PACK PROCEDURE SURG CYSTO SVMMC LF

## (undated) DEVICE — Y-TYPE TUR/BLADDER IRRIGATION SET, REGULATING CLAMP

## (undated) DEVICE — GOWN,AURORA,NONREINFORCED,LARGE: Brand: MEDLINE

## (undated) DEVICE — DRAPE,REIN 53X77,STERILE: Brand: MEDLINE

## (undated) DEVICE — MAT FLOOR ULTRA ABS 28X48IN

## (undated) DEVICE — Z DISCONTINUED USE 2272124 DRAPE SURG XL N INVASIVE 2 LAYR DISP

## (undated) DEVICE — CRANIOTOMY DRAPE, STERILE: Brand: MEDLINE

## (undated) DEVICE — ADAPTER URO SCP UROLOK LL

## (undated) DEVICE — APPLICATOR MEDICATED 26 CC SOLUTION HI LT ORNG CHLORAPREP

## (undated) DEVICE — DRAINBAG,ANTI-REFLUX TOWER,L/F,2000ML,LL: Brand: MEDLINE

## (undated) DEVICE — SOLUTION IRRIGATION STRL H2O 1000 ML UROMATIC CONTAINER

## (undated) DEVICE — GLOVE SURG SZ 65 THK91MIL LTX FREE SYN POLYISOPRENE

## (undated) DEVICE — GAUZE,SPONGE,FLUFF,6"X6.75",STRL,5/TRAY: Brand: MEDLINE

## (undated) DEVICE — GLOVE ORANGE PI 7   MSG9070

## (undated) DEVICE — COVER OR TBL W40XL90IN ABSRB STD AND GRIPPY BK SAHARA

## (undated) DEVICE — 3M™ IOBAN™ 2 ANTIMICROBIAL INCISE DRAPE 6650EZ: Brand: IOBAN™ 2

## (undated) DEVICE — INTENDED FOR TISSUE SEPARATION, AND OTHER PROCEDURES THAT REQUIRE A SHARP SURGICAL BLADE TO PUNCTURE OR CUT.: Brand: BARD-PARKER ® CARBON RIB-BACK BLADES

## (undated) DEVICE — SINGLE ACTION PUMPING SYSTEM

## (undated) DEVICE — STRAP,CATHETER,ELASTIC,HOOK&LOOP: Brand: MEDLINE

## (undated) DEVICE — CATCHER STONE TBNG ADPT SWISS LITHOCLAST